# Patient Record
Sex: MALE | Race: WHITE
[De-identification: names, ages, dates, MRNs, and addresses within clinical notes are randomized per-mention and may not be internally consistent; named-entity substitution may affect disease eponyms.]

---

## 2017-11-03 ENCOUNTER — HOSPITAL ENCOUNTER (OUTPATIENT)
Dept: HOSPITAL 92 - SDC | Age: 67
Discharge: HOME | End: 2017-11-03
Attending: ORTHOPAEDIC SURGERY
Payer: MEDICARE

## 2017-11-03 VITALS — BODY MASS INDEX: 42.7 KG/M2

## 2017-11-03 DIAGNOSIS — E66.9: ICD-10-CM

## 2017-11-03 DIAGNOSIS — K21.9: ICD-10-CM

## 2017-11-03 DIAGNOSIS — S82.191G: ICD-10-CM

## 2017-11-03 DIAGNOSIS — E11.319: ICD-10-CM

## 2017-11-03 DIAGNOSIS — I25.10: ICD-10-CM

## 2017-11-03 DIAGNOSIS — Z88.2: ICD-10-CM

## 2017-11-03 DIAGNOSIS — Z98.890: ICD-10-CM

## 2017-11-03 DIAGNOSIS — E11.21: ICD-10-CM

## 2017-11-03 DIAGNOSIS — E11.40: ICD-10-CM

## 2017-11-03 DIAGNOSIS — E03.9: ICD-10-CM

## 2017-11-03 DIAGNOSIS — E78.5: ICD-10-CM

## 2017-11-03 DIAGNOSIS — I50.9: ICD-10-CM

## 2017-11-03 DIAGNOSIS — I11.0: ICD-10-CM

## 2017-11-03 DIAGNOSIS — T81.4XXA: Primary | ICD-10-CM

## 2017-11-03 LAB
ANION GAP SERPL CALC-SCNC: 12 MMOL/L (ref 10–20)
APTT PPP: 30.2 SEC (ref 22.9–36.1)
BACTERIA UR QL AUTO: (no result) HPF
BASOPHILS # BLD AUTO: 0 THOU/UL (ref 0–0.2)
BASOPHILS NFR BLD AUTO: 0.1 % (ref 0–1)
BUN SERPL-MCNC: 44 MG/DL (ref 8.4–25.7)
CALCIUM SERPL-MCNC: 8.3 MG/DL (ref 7.8–10.44)
CHLORIDE SERPL-SCNC: 105 MMOL/L (ref 98–107)
CO2 SERPL-SCNC: 28 MMOL/L (ref 23–31)
CREAT CL PREDICTED SERPL C-G-VRATE: 51 ML/MIN (ref 70–130)
EOSINOPHIL # BLD AUTO: 0.1 THOU/UL (ref 0–0.7)
EOSINOPHIL NFR BLD AUTO: 1.2 % (ref 0–10)
HCT VFR BLD CALC: 22.7 % (ref 42–52)
HYALINE CASTS #/AREA URNS LPF: (no result) LPF
LYMPHOCYTES # BLD: 1 THOU/UL (ref 1.2–3.4)
LYMPHOCYTES NFR BLD AUTO: 10 % (ref 21–51)
MONOCYTES # BLD AUTO: 0.7 THOU/UL (ref 0.11–0.59)
MONOCYTES NFR BLD AUTO: 6.8 % (ref 0–10)
NEUTROPHILS # BLD AUTO: 8.6 THOU/UL (ref 1.4–6.5)
PROT UR STRIP.AUTO-MCNC: 100 MG/DL
PROTHROMBIN TIME: 15.7 SEC (ref 12–14.7)
RBC # BLD AUTO: 2.61 MILL/UL (ref 4.7–6.1)
RBC UR QL AUTO: (no result) HPF (ref 0–3)
WBC # BLD AUTO: 10.4 THOU/UL (ref 4.8–10.8)
WBC UR QL AUTO: (no result) HPF (ref 0–3)

## 2017-11-03 PROCEDURE — 93010 ELECTROCARDIOGRAM REPORT: CPT

## 2017-11-03 PROCEDURE — 85025 COMPLETE CBC W/AUTO DIFF WBC: CPT

## 2017-11-03 PROCEDURE — 85730 THROMBOPLASTIN TIME PARTIAL: CPT

## 2017-11-03 PROCEDURE — C9363 INTEGRA MESHED BIL WOUND MAT: HCPCS

## 2017-11-03 PROCEDURE — A4216 STERILE WATER/SALINE, 10 ML: HCPCS

## 2017-11-03 PROCEDURE — 15273 SKIN SUB GRFT T/ARM/LG CHILD: CPT

## 2017-11-03 PROCEDURE — 36416 COLLJ CAPILLARY BLOOD SPEC: CPT

## 2017-11-03 PROCEDURE — 81001 URINALYSIS AUTO W/SCOPE: CPT

## 2017-11-03 PROCEDURE — 80048 BASIC METABOLIC PNL TOTAL CA: CPT

## 2017-11-03 PROCEDURE — 82962 GLUCOSE BLOOD TEST: CPT

## 2017-11-03 PROCEDURE — 93005 ELECTROCARDIOGRAM TRACING: CPT

## 2017-11-03 PROCEDURE — 85610 PROTHROMBIN TIME: CPT

## 2017-11-03 PROCEDURE — 71010: CPT

## 2017-11-03 NOTE — RAD
CHEST ONE VIEW:

 

HISTORY:

Preop.

 

COMPARISON:

07/20/2017

 

FINDINGS:

The cardiac silhouette is magnified and at the upper limits of normal in size.  Pulmonary vasculatur
e is at the upper limits of normal and accentuated by shallow inspiration.  Mediastinum is midline w
ith postoperative changes evident.  Opacity at each costophrenic angle, greater on the right, has th
e appearance of pleural fluid.

 

IMPRESSION:

Borderline pulmonary vascular congestion with small bilateral pleural effusions.

 

POS: [facility]

## 2017-11-03 NOTE — OP
DATE OF SURGERY:  11/03/2017

 

PREOPERATIVE DIAGNOSIS:  Right patellar tendon, 2 cm open wound with exposed tendon.

 

FINDINGS:  

1.  Right patellar tendon, 2 cm open wound with exposed tendon.  No gross infection.

2.  Multiple fungal infection areas, perineum and distal two-thirds of the leg.

 

COMPLICATIONS:  None.

 

TOURNIQUET TIME:  None.

 

ESTIMATED BLOOD LOSS:  Less than or equal to 5 mL.

 

PROCEDURE:

1.  Debridement of wound.

2.  Patellar tendon tenotomy.

3.  Application of Integra graft, 2 inch x 2 inch to cover what really was a 1.5 inch diameter wound
 rectangular shaped.

4.  Application of nystatin ointment to all the fungal areas of the right leg and the perineum at th
e end of the procedure.

 

COMPLICATIONS:  At the end of the procedure after we had placed the dressings on the fungal portions
 of the leg and the patella operation, the patient began to have copious bowel movements requiring c
hanges of dressing and perineal care.  This occurred in the operating room.  

 

ANESTHESIA:  Anesthesia by [employer] [facility]. 

 

PROCEDURE IN DETAIL:  The limb prepped and draped.  Timeout was done appropriately.  The patient was
 large and had to be placed on the bed using a HoverMatt.  HoverMatt had been deflated.  

 

We did tie lower extremity completely prepped and draped the ankle and foot covered with a sterile s
tockinette, we then began a debridement.  We used a tenotomy scissor, 11 blade knife, Adson's, and a
 curet.  We debrided the wound edges down to include the fascia and patellar tendon.  I then perform
ed patellar tenotomy approximately 1-1.5 mm stick accomplished to get to fresh tendon as the remaini
ng tendon seen was already exposed to the air.  We then dissected around the tissue between the tend
on and the subcutaneous tissue and the tendon and deep and we did not find a joint infection, both v
isible or expressible.

 

We irrigated the area with 500 mL normal saline.  We obtained hemostasis.  We then placed Integra gr
aft on the wound, stapled it down appropriately, including stapled in the center.  It was then bolst
ered with bacitracin under Adaptic under soaked salt mineral oil cotton balls.  All was stapled.  Th
ere was no gross motion at the bolstered area.  This would be with the knee flexed 90 degrees, but t
he patient does not ambulate.

 

We then placed bacitracin on the patient's leg from the edge of the patella tendon dressing to the m
edial malleolus and cover that with a Kerlix, and then placed Ace wrap on both.  When we tried to pr
epare the patient to leave the bed, he began to have a bowel movement described above which required
 cleaning, and eventually had to place him in a diaper.  He left; however, with this cleaned with ev
idence of an external hemorrhoid.  Bacitracin was also applied in the scrotal area where he had some
 fungus as well.

 

The patient went to recovery room without evidence of anesthetic or operative complication.

## 2017-11-03 NOTE — EKG
Test Reason : PREOP

Blood Pressure : ***/*** mmHG

Vent. Rate : 106 BPM     Atrial Rate : 106 BPM

   P-R Int : 000 ms          QRS Dur : 086 ms

    QT Int : 350 ms       P-R-T Axes : 065 017 160 degrees

   QTc Int : 464 ms

 

Sinus tachycardia

Low voltage QRS

Possible Inferior infarct (cited on or before 30-MAR-1997)

Abnormal ECG

When compared with ECG of 17-JUN-2017 09:43,

Vent. rate has increased BY  44 BPM

T wave inversion more evident in Lateral leads

Confirmed by DR. SARAH LLAMAS ([id number]) on 11/3/2017 4:13:02 PM

 

Referred By:  SEBAS           Confirmed By:DR. SARAH LLAMAS

## 2018-01-02 ENCOUNTER — HOSPITAL ENCOUNTER (INPATIENT)
Dept: HOSPITAL 92 - ERS | Age: 68
LOS: 15 days | Discharge: NURSING, REHAB OR LONG-TERM CARE | DRG: 580 | End: 2018-01-17
Attending: INTERNAL MEDICINE | Admitting: INTERNAL MEDICINE
Payer: MEDICARE

## 2018-01-02 VITALS — BODY MASS INDEX: 37.8 KG/M2

## 2018-01-02 DIAGNOSIS — Z95.1: ICD-10-CM

## 2018-01-02 DIAGNOSIS — L03.115: Primary | ICD-10-CM

## 2018-01-02 DIAGNOSIS — N17.9: ICD-10-CM

## 2018-01-02 DIAGNOSIS — E66.9: ICD-10-CM

## 2018-01-02 DIAGNOSIS — S82.101A: ICD-10-CM

## 2018-01-02 DIAGNOSIS — E10.69: ICD-10-CM

## 2018-01-02 DIAGNOSIS — M86.161: ICD-10-CM

## 2018-01-02 DIAGNOSIS — E87.5: ICD-10-CM

## 2018-01-02 DIAGNOSIS — E10.40: ICD-10-CM

## 2018-01-02 DIAGNOSIS — T84.622A: ICD-10-CM

## 2018-01-02 DIAGNOSIS — I25.10: ICD-10-CM

## 2018-01-02 DIAGNOSIS — M00.9: ICD-10-CM

## 2018-01-02 DIAGNOSIS — E10.65: ICD-10-CM

## 2018-01-02 DIAGNOSIS — I87.2: ICD-10-CM

## 2018-01-02 DIAGNOSIS — D63.1: ICD-10-CM

## 2018-01-02 DIAGNOSIS — E87.1: ICD-10-CM

## 2018-01-02 DIAGNOSIS — Z89.421: ICD-10-CM

## 2018-01-02 DIAGNOSIS — N18.4: ICD-10-CM

## 2018-01-02 DIAGNOSIS — B95.62: ICD-10-CM

## 2018-01-02 DIAGNOSIS — E10.649: ICD-10-CM

## 2018-01-02 DIAGNOSIS — I12.9: ICD-10-CM

## 2018-01-02 DIAGNOSIS — I87.8: ICD-10-CM

## 2018-01-02 DIAGNOSIS — E10.22: ICD-10-CM

## 2018-01-02 LAB
ALBUMIN SERPL BCG-MCNC: 2.6 G/DL (ref 3.4–4.8)
ALP SERPL-CCNC: 120 U/L (ref 40–150)
ALT SERPL W P-5'-P-CCNC: 16 U/L (ref 8–55)
ANION GAP SERPL CALC-SCNC: 15 MMOL/L (ref 10–20)
AST SERPL-CCNC: 21 U/L (ref 5–34)
BACTERIA UR QL AUTO: (no result) HPF
BASOPHILS # BLD AUTO: 0 THOU/UL (ref 0–0.2)
BASOPHILS NFR BLD AUTO: 0 % (ref 0–1)
BILIRUB SERPL-MCNC: 0.3 MG/DL (ref 0.2–1.2)
BUN SERPL-MCNC: 72 MG/DL (ref 8.4–25.7)
CALCIUM SERPL-MCNC: 8.9 MG/DL (ref 7.8–10.44)
CHLORIDE SERPL-SCNC: 102 MMOL/L (ref 98–107)
CO2 SERPL-SCNC: 25 MMOL/L (ref 23–31)
CREAT CL PREDICTED SERPL C-G-VRATE: 0 ML/MIN (ref 70–130)
CRYSTAL-AUWI FLAG: 0 (ref 0–15)
EOSINOPHIL # BLD AUTO: 0.2 THOU/UL (ref 0–0.7)
EOSINOPHIL NFR BLD AUTO: 2.1 % (ref 0–10)
GLOBULIN SER CALC-MCNC: 4.6 G/DL (ref 2.4–3.5)
GLUCOSE SERPL-MCNC: 144 MG/DL (ref 80–115)
HEV IGM SER QL: 0 (ref 0–7.99)
HGB BLD-MCNC: 7.3 G/DL (ref 14–18)
HYALINE CASTS #/AREA URNS LPF: (no result) LPF
LYMPHOCYTES # BLD: 1.3 THOU/UL (ref 1.2–3.4)
LYMPHOCYTES NFR BLD AUTO: 12.4 % (ref 21–51)
MCH RBC QN AUTO: 28 PG (ref 27–31)
MCV RBC AUTO: 88.6 FL (ref 80–94)
MONOCYTES # BLD AUTO: 0.8 THOU/UL (ref 0.11–0.59)
MONOCYTES NFR BLD AUTO: 8.1 % (ref 0–10)
NEUTROPHILS # BLD AUTO: 8 THOU/UL (ref 1.4–6.5)
NEUTROPHILS NFR BLD AUTO: 77.5 % (ref 42–75)
PATHC CAST-AUWI FLAG: 0.13 (ref 0–2.49)
PLATELET # BLD AUTO: 440 THOU/UL (ref 130–400)
POTASSIUM SERPL-SCNC: 4.5 MMOL/L (ref 3.5–5.1)
PROT UR STRIP.AUTO-MCNC: 300 MG/DL
RBC # BLD AUTO: 2.62 MILL/UL (ref 4.7–6.1)
RBC UR QL AUTO: (no result) HPF (ref 0–3)
SODIUM SERPL-SCNC: 137 MMOL/L (ref 136–145)
SP GR UR STRIP: 1.01 (ref 1–1.04)
SPERM-AUWI FLAG: 0 (ref 0–9.9)
WBC # BLD AUTO: 10.3 THOU/UL (ref 4.8–10.8)
YEAST-AUWI FLAG: 0 (ref 0–25)

## 2018-01-02 PROCEDURE — C1713 ANCHOR/SCREW BN/BN,TIS/BN: HCPCS

## 2018-01-02 PROCEDURE — 85610 PROTHROMBIN TIME: CPT

## 2018-01-02 PROCEDURE — 85652 RBC SED RATE AUTOMATED: CPT

## 2018-01-02 PROCEDURE — 36415 COLL VENOUS BLD VENIPUNCTURE: CPT

## 2018-01-02 PROCEDURE — 36416 COLLJ CAPILLARY BLOOD SPEC: CPT

## 2018-01-02 PROCEDURE — 81003 URINALYSIS AUTO W/O SCOPE: CPT

## 2018-01-02 PROCEDURE — 86140 C-REACTIVE PROTEIN: CPT

## 2018-01-02 PROCEDURE — 96375 TX/PRO/DX INJ NEW DRUG ADDON: CPT

## 2018-01-02 PROCEDURE — 36430 TRANSFUSION BLD/BLD COMPNT: CPT

## 2018-01-02 PROCEDURE — 86901 BLOOD TYPING SEROLOGIC RH(D): CPT

## 2018-01-02 PROCEDURE — 83735 ASSAY OF MAGNESIUM: CPT

## 2018-01-02 PROCEDURE — 80053 COMPREHEN METABOLIC PANEL: CPT

## 2018-01-02 PROCEDURE — 80048 BASIC METABOLIC PNL TOTAL CA: CPT

## 2018-01-02 PROCEDURE — 96366 THER/PROPH/DIAG IV INF ADDON: CPT

## 2018-01-02 PROCEDURE — 85025 COMPLETE CBC W/AUTO DIFF WBC: CPT

## 2018-01-02 PROCEDURE — 85027 COMPLETE CBC AUTOMATED: CPT

## 2018-01-02 PROCEDURE — 85730 THROMBOPLASTIN TIME PARTIAL: CPT

## 2018-01-02 PROCEDURE — 78315 BONE IMAGING 3 PHASE: CPT

## 2018-01-02 PROCEDURE — 87186 SC STD MICRODIL/AGAR DIL: CPT

## 2018-01-02 PROCEDURE — 96365 THER/PROPH/DIAG IV INF INIT: CPT

## 2018-01-02 PROCEDURE — 83605 ASSAY OF LACTIC ACID: CPT

## 2018-01-02 PROCEDURE — C1751 CATH, INF, PER/CENT/MIDLINE: HCPCS

## 2018-01-02 PROCEDURE — 36569 INSJ PICC 5 YR+ W/O IMAGING: CPT

## 2018-01-02 PROCEDURE — 81015 MICROSCOPIC EXAM OF URINE: CPT

## 2018-01-02 PROCEDURE — 86900 BLOOD TYPING SEROLOGIC ABO: CPT

## 2018-01-02 PROCEDURE — 86850 RBC ANTIBODY SCREEN: CPT

## 2018-01-02 PROCEDURE — 87077 CULTURE AEROBIC IDENTIFY: CPT

## 2018-01-02 PROCEDURE — 76001: CPT

## 2018-01-02 PROCEDURE — 80202 ASSAY OF VANCOMYCIN: CPT

## 2018-01-02 PROCEDURE — P9016 RBC LEUKOCYTES REDUCED: HCPCS

## 2018-01-02 PROCEDURE — A4216 STERILE WATER/SALINE, 10 ML: HCPCS

## 2018-01-02 PROCEDURE — 87205 SMEAR GRAM STAIN: CPT

## 2018-01-02 PROCEDURE — 76770 US EXAM ABDO BACK WALL COMP: CPT

## 2018-01-02 PROCEDURE — 87070 CULTURE OTHR SPECIMN AEROBIC: CPT

## 2018-01-02 PROCEDURE — A9503 TC99M MEDRONATE: HCPCS

## 2018-01-02 PROCEDURE — C1769 GUIDE WIRE: HCPCS

## 2018-01-02 NOTE — RAD
EXAM:

RIGHT KNEE THREE VIEWS

1/2/18

 

HISTORY: 

Cellulitis.

 

COMPARISON:  

None.

 

FINDINGS:  

There is a fracture and irregularity involving the proximal tibial extensive sclerosis is identified.
 The possibility of an incompletely healed fracture is raised. An underlying infectious process canno
t be excluded. 

 

Extensive enthesopathic change and bony hypertrophy is noted. 

 

Incompletely evaluated internal fixation hardware is present. 

 

There is evidence of a suprapatellar effusion.

 

IMPRESSION:  

Incompletely healed fracture. Acute fracture or osteomyelitis cannot be excluded. Comparison with natalie
or radiographs would be beneficial. The only prior radiography available is a two views right tibia/f
ibula on 6/17/17 at the time of fracture. 

 

POS: [facility]

## 2018-01-03 LAB
ANION GAP SERPL CALC-SCNC: 16 MMOL/L (ref 10–20)
BASOPHILS # BLD AUTO: 0 THOU/UL (ref 0–0.2)
BASOPHILS NFR BLD AUTO: 0.4 % (ref 0–1)
BUN SERPL-MCNC: 69 MG/DL (ref 8.4–25.7)
CALCIUM SERPL-MCNC: 8.8 MG/DL (ref 7.8–10.44)
CHLORIDE SERPL-SCNC: 104 MMOL/L (ref 98–107)
CO2 SERPL-SCNC: 22 MMOL/L (ref 23–31)
CREAT CL PREDICTED SERPL C-G-VRATE: 38 ML/MIN (ref 70–130)
EOSINOPHIL # BLD AUTO: 0.2 THOU/UL (ref 0–0.7)
EOSINOPHIL NFR BLD AUTO: 2 % (ref 0–10)
GLUCOSE SERPL-MCNC: 182 MG/DL (ref 80–115)
HGB BLD-MCNC: 7.7 G/DL (ref 14–18)
LYMPHOCYTES # BLD: 1 THOU/UL (ref 1.2–3.4)
LYMPHOCYTES NFR BLD AUTO: 10.1 % (ref 21–51)
MCH RBC QN AUTO: 28.5 PG (ref 27–31)
MCV RBC AUTO: 89 FL (ref 80–94)
MONOCYTES # BLD AUTO: 0.7 THOU/UL (ref 0.11–0.59)
MONOCYTES NFR BLD AUTO: 7.5 % (ref 0–10)
NEUTROPHILS # BLD AUTO: 7.7 THOU/UL (ref 1.4–6.5)
NEUTROPHILS NFR BLD AUTO: 80 % (ref 42–75)
PLATELET # BLD AUTO: 432 THOU/UL (ref 130–400)
POTASSIUM SERPL-SCNC: 4.7 MMOL/L (ref 3.5–5.1)
RBC # BLD AUTO: 2.7 MILL/UL (ref 4.7–6.1)
SODIUM SERPL-SCNC: 137 MMOL/L (ref 136–145)
WBC # BLD AUTO: 9.6 THOU/UL (ref 4.8–10.8)

## 2018-01-03 PROCEDURE — 30233N1 TRANSFUSION OF NONAUTOLOGOUS RED BLOOD CELLS INTO PERIPHERAL VEIN, PERCUTANEOUS APPROACH: ICD-10-PCS | Performed by: INTERNAL MEDICINE

## 2018-01-03 RX ADMIN — HEPARIN SODIUM SCH UNITS: 5000 INJECTION, SOLUTION INTRAVENOUS; SUBCUTANEOUS at 15:34

## 2018-01-03 RX ADMIN — HYDROCODONE BITARTRATE AND ACETAMINOPHEN PRN TAB: 5; 325 TABLET ORAL at 09:53

## 2018-01-03 RX ADMIN — HEPARIN SODIUM SCH UNITS: 5000 INJECTION, SOLUTION INTRAVENOUS; SUBCUTANEOUS at 20:56

## 2018-01-03 RX ADMIN — CEFEPIME HYDROCHLORIDE SCH MLS: 1 INJECTION, POWDER, FOR SOLUTION INTRAMUSCULAR; INTRAVENOUS at 06:06

## 2018-01-03 RX ADMIN — ASPIRIN SCH MG: 81 TABLET ORAL at 09:56

## 2018-01-03 RX ADMIN — HEPARIN SODIUM SCH UNITS: 5000 INJECTION, SOLUTION INTRAVENOUS; SUBCUTANEOUS at 09:58

## 2018-01-03 RX ADMIN — VANCOMYCIN HYDROCHLORIDE SCH MLS: 1 INJECTION, POWDER, LYOPHILIZED, FOR SOLUTION INTRAVENOUS at 20:51

## 2018-01-03 RX ADMIN — HYDROCODONE BITARTRATE AND ACETAMINOPHEN PRN TAB: 5; 325 TABLET ORAL at 15:33

## 2018-01-03 RX ADMIN — HYDROCODONE BITARTRATE AND ACETAMINOPHEN PRN TAB: 5; 325 TABLET ORAL at 06:10

## 2018-01-03 RX ADMIN — Medication SCH ML: at 06:06

## 2018-01-03 RX ADMIN — Medication SCH ML: at 20:52

## 2018-01-03 RX ADMIN — HYDROCODONE BITARTRATE AND ACETAMINOPHEN PRN TAB: 5; 325 TABLET ORAL at 20:53

## 2018-01-03 RX ADMIN — INSULIN LISPRO PRN UNIT: 100 INJECTION, SOLUTION INTRAVENOUS; SUBCUTANEOUS at 12:57

## 2018-01-03 NOTE — CON
DATE OF CONSULTATION:  01/03/2018

 

REASON FOR CONSULTATION:  Inflammatory process of right lower extremity.

 

HISTORY OF PRESENT ILLNESS:  A 67-year-old whom I had seen at end of June last year when he presented
 with a history of type 2 diabetes, hypertension, stage III renal insufficiency who sustained a fall 
and had a tibia fibula fracture in June of last year.  The patient had intramedullary nailing with lo
cking screws and then he was given Omnicef for some indication probably cellulitis, then developed in
flammatory changes in the right leg with pain, admitted with hypotension.  Patient was given IV vanco
mycin.  At that time, Pseudomonas mendocina was isolated from the site and he was continued on vancom
ycin and cefepime.  Because of concern with deeper infection, he was continued on cefepime and vancom
ycin till the end of August.  In November of last year, he had a procedure to manage nonhealing wound
 in the right patellar tendon area with an area of exposed tendon and he underwent debridement of wou
nd in patellar tenotomy application of a skin graft.  At that point, we do not have any microbiology 
information.  At this time, patient was brought in because of worsening inflammatory changes in the r
ight lower extremity around the knee and some drainage in the anterior segment associated with rednes
s.

 

Initial laboratory data includes; white cell count of 10.3, hemoglobin 7.3, MCV 88, platelets 440 wit
h 77% neutrophils and C-reactive protein was 18.81 and creatinine was a little bit higher than baseli
ne at 3.41.  There is x-ray of the knee, which showed incompletely healed fracture with irregularity 
involving the proximal tibial with extensive sclerosis identified.  Currently, Mr. Sanchez is awake. 
 He does not have a lot of sensation in the right lower extremity.  No headaches, no sore throat, marce
nophagia, dysphagia, no dyspnea, no cough or chest pain, no abdominal pain or diarrhea.  He is able t
o void without difficulty.

 

PAST MEDICAL HISTORY:  Includes obesity, type 2 diabetes, renal insufficiency stage 3, hypertension, 
neuropathy, fracture right lower extremity with open reduction and internal fixation which had to be 
removed, protracted treatment with cefepime and vancomycin and then a skin graft for persistent wound
 area in the right patellar region on the right side.

 

ALLERGIES:  SULFA DRUGS with rash.

 

CURRENT MEDICATIONS:  Includes Tylenol, Norco, Ecotrin, Bumex, PhosLo, cefepime, dextrose, Cymbalta, 
Pepcid, glucagon, Apresoline, insulin, levothyroxine, ondansetron, Crestor, tamsulosin, and vancomyci
n.

 

FAMILY HISTORY:  Noncontributory.

 

PHYSICAL EXAMINATION:

VITAL SIGNS:  Essentially normal.

SKIN:  Examination shows the areas of venous insufficiency, stasis dermatitis, hyperkeratosis in the 
legs.  An area of erythema in the circumferential distribution with the epicenter right in the anteri
or knee area, there is a depression at the center of the prepatellar region.  A little bit of dried b
lood.  No purulence.  The erythema extends from that area towards the entire anterior prepatellar reg
ion extending almost circumferentially around the knee and lower to the upper segment of the right le
g.  The patient has peripheral IV access and no Roa catheter.  No lymphadenopathy.

HEENT:  Ocular movements are conjugate.  Patient has only a few remaining teeth in marked decay.

NECK:  Supple, no jugular vein distention.

LUNGS:  With symmetric clear breath sounds.

HEART:  S1, S2.  No S3 or S4.

ABDOMEN:  Soft, not distended or tender.  No ascites.  No bladder distention.

EXTREMITIES:  Pulses are faintly palpable in dorsalis pedis.  Capillary refill was normal.  He has qu
ite a bit of mobility impairment in lower extremities with stiffness, weakness.  Plantar responses ar
e indifferent in the upper extremities.  He is able to move a little bit better, but he does have atr
ophy of the interosseous muscles in the right hand, which is a chronic finding.

NEUROLOGIC:  His cognitive function is about the same.  He is awake, follows commands.  Fairly decent
 recollection.

 

LABORATORY DATA:  The latest creatinine down to 3.24, glucose 185, CO2 22.  Liver profile normal.  Al
bumin 2.6 and again reviewing the cultures from July last year with MRSA from tibia tissue, Staph epi
dermides, Staph aureus, MRSA, vancomycin OLAYINKA was 1.

 

ASSESSMENT:  Diabetes type 2, renal insufficiency stage 4, hypertension, coronary artery disease, fra
ctured right tibia and knee now with inflammatory process, removal of hardware and protracted treatme
nt with antimicrobial therapy, now with recrudescence of inflammatory process following November proc
edure.

 

DISCUSSION:  The concern is with osteomyelitis of the tibia including the possibility of chronic oste
omyelitis versus a more superficial process.  We will proceed with an MRI without contrast and contin
ue antimicrobial therapy.  We may need to request Orthopedic Surgery assistance to biopsy the area fo
r cultures.  It is likely that the same organisms involved in July process would be involved.  We cou
ld therefore potentially treat empirically as well.  I believe blood cultures have been submitted.  HOSSEIN nolasco will follow those.

## 2018-01-03 NOTE — HP
PRIMARY CARE PHYSICIAN:  Iván Leung MD

 

CHIEF COMPLAINT:  "My legs have been becoming more sore for the last 3 days."

 

HISTORY OF PRESENT ILLNESS:  Mr. Dexter is a pleasant 67-year-old gentleman who has a history of di
abetes mellitus as well as hypertension.  The patient recently had surgery on his right knee in which
 he had some rods and pins placed as well as had some infected hardware removed.  He was treated in [facility] facility and placed on vancomycin and cefepime in which he took until the end of August.  He says 
that he was doing fine until about 3-4 days ago when he noticed that his right leg was becoming more 
and more sore.  He lives at [facility] and the nurse there noted some drainage from the knee as we
ll.  He says he is unable to tell whether or not there was more redness because he is unable to look 
down to see his legs, but because of the drainage and the soreness, he was sent to the emergency room
 at the [facility].  He was evaluated and then transferred to our facility for further 
evaluation due to concern for cellulitis and possible infected joints.  The patient denies any fevers
 or chills.  No other significant complaints.  He is basically nonambulatory, but says he was able to
 do some transferring in and out of bed on his own, but he says this has become more difficult due to
 the pain.

 

REVIEW OF SYSTEMS:  Constitutional:  He denies any fevers or chills, no night sweats or weight loss. 
 HEENT:  No headaches, no dizziness, no visual changes, no sore throat, no rhinorrhea, neck pain, no 
adenopathy.  Pulmonary:  No hemoptysis, no cough, no wheezing.  Cardiovascular:  He denies any chest 
pain, no shortness of breath, no PND, no orthopnea.  Gastrointestinal:  No abdominal pain, no nausea,
 no vomiting, no change in bowels.  Genitourinary:  No urinary frequency, hematuria, no hesitancy.  N
eurologic:  No focal weakness, numbness, no seizures.  Psychiatric:  No symptoms of anxiety or depres
yolette.  Skin and Integument:  As the history of present illness.

 

PAST MEDICAL HISTORY:  Significant for diabetes mellitus type 1.  He says he has been diabetic for 50
 years, chronic kidney disease stage 4, hypertension, and coronary artery disease.

 

PAST SURGICAL HISTORY:  He has had coronary artery bypass surgery, left ankle surgery, right elbow serrano
rgery, fifth toe on the right foot amputated, as well as the knee surgery, and hardware removal.

 

SOCIAL HISTORY:  He resides at [facility].  He is a nonsmoker, nondrinker.  He has 2 children.  He
 is .  His surrogate decision maker is Mr. Frandy Sanchez and he would like to be a [employer]
.

 

FAMILY HISTORY:  Significant for diabetes in his mother as well as coronary artery disease in his fat
her.

 

ALLERGIES:  SULFA.

 

MEDICATIONS:  Taken from the records from [facility] and include aspirin 81 mg daily, Lipitor 40 m
g daily, bumetanide 0.5 mg twice a day, calcium acetate 667 mg 2 tablets t.i.d., docusate 100 mg p.r.
n., Cymbalta 60 mg daily, fentanyl patch 25 mcg every 3 days, Gas-X p.r.n., hydralazine 25 mg q.i.d.,
 Humalog insulin 5 units t.i.d. with meals, Norco 5/325 q.4 p.r.n., omeprazole 20 mg daily, Lantus in
sulin 35 units twice a day, Zofran 4 mg p.r.n., MiraLax 17 grams daily, Synthroid 25 mcg daily, Floma
x 0.4 mg daily, Xanax 0.5 mg as needed, vitamin C daily, and Crestor 20 mg daily.

 

PHYSICAL EXAMINATION:

GENERAL:  He is alert and oriented.  He appears to be in no acute distress.

VITAL SIGNS:  Blood pressure was 111/48, heart rate 91, respiratory rate of 16, temperature is 98.2.

HEENT:  Pupils are equal, round, and reactive.  Extraocular muscles are intact.  Sclerae are anicteri
c.  Throat:  There is no erythema, no exudates.

NECK:  No adenopathy, no bruits.

LUNGS:  Clear.  There is no wheezing, no rales.

CARDIOVASCULAR:  He has normal S1, S2.  I did not appreciate an S3 or S4.  No murmurs, clicks, or rub
s.

ABDOMEN:  Obese, it is soft, it is nontender, nondistended.  Positive for bowel sounds.  No rebound o
r guarding.

EXTREMITIES:  He has got chronic venous stasis changes bilaterally.  On his right knee, he has some s
erous drainage.  There are approximately four staples that are still in place.  The skin is thickened
.  He has got some scaling there on most of the calf as well as some erythema and mild warmth.  His f
ifth toes amputated on the right foot, he has got diminished, but palpable dorsalis pedis pulses.  Al
so, on the left leg, there is some excoriation and some erythema as well.

NEUROLOGICALLY:  The exam is grossly nonfocal.

 

SIGNIFICANT LABORATORY:  White blood cell count 10.3, hemoglobin 7.3, hematocrit is 23.2, platelet co
unt is 440.  Sodium 137, potassium 4.5, chloride is 102, CO2 is 25, BUN of 72, creatinine 3.41, gluco
se is 144.  C-reactive protein is elevated at 18.81.

 

ASSESSMENT AND PLAN:

1.  This is a pleasant 67-year-old gentleman who has developed some pain as well as redness and drain
age from the right knee as well as an area that appears to be cellulitis in the right lower extremity
.  It is possible that he has recrudescence of the infection that he had treated back in August.  The
 plan will be to place him in the hospital and we will start him on IV antibiotics.  We will place tana brooks on the antibiotics that he had been treated on his prior hospitalization and this included vancomyc
in and cefepime.  We will consult Infectious Disease for aid in antibiotic choice and length of thera
py and we will also consult Orthopedic Surgery in the event that the joint is infected.

2.  Diabetes mellitus.  We will go ahead and continue his usual medications as well as sliding scale 
insulin.

3.  Chronic kidney disease, stage 4.  He appears to have some degree of acute renal failure on top of
 chronic kidney disease.  This could be related to the infection.  We will continue to monitor his cr
eatinine and consult Nephrology if needed.

4.  Coronary artery disease.  This appears to be clinically stable.  We will continue his medications
 regarding coronary artery disease.

5.  The patient will be placed on deep venous thrombosis and gastrointestinal prophylaxis.

## 2018-01-03 NOTE — PDOC.PN
- Subjective


Encounter Start Date: 01/03/18


Encounter Start Time: 09:30


-: old records requested/rev





Patient seen and examined. No new complaints. No overnight events





- Objective


Resuscitation Status: 


 











Resuscitation Status           FULL:Full Resuscitation














MAR Reviewed: Yes


Vital Signs & Weight: 


 Vital Signs (12 hours)











  Temp Pulse Resp BP Pulse Ox


 


 01/03/18 09:57   89   


 


 01/03/18 08:33  98.4 F  89  19  98/49 L  97


 


 01/03/18 01:14  98.2 F  96  20  117/55 L  100








 Weight











Weight                         264 lb 8.875 oz














I&O: 


 











 01/02/18 01/03/18 01/04/18





 06:59 06:59 06:59


 


Intake Total  450 


 


Output Total  620 


 


Balance  -170 











Result Diagrams: 


 01/03/18 06:03





 01/03/18 06:03


Additional Labs: 


 Accuchecks











  01/03/18





  06:20


 


POC Glucose  185 H














Phys Exam





- Physical Examination


Constitutional: NAD


HEENT: PERRLA, moist MMs, sclera anicteric


Neck: no JVD, supple


Respiratory: no wheezing, no rales, no rhonchi


Cardiovascular: RRR, no significant murmur, no rub


Gastrointestinal: soft, non-tender, no distention, positive bowel sounds


Musculoskeletal: pulses present


right leg cellulitis


Neurological: non-focal, normal sensation


Psychiatric: normal affect


Skin: no rash, normal turgor





Dx/Plan


(1) Cellulitis of right lower extremity


Code(s): L03.115 - CELLULITIS OF RIGHT LOWER LIMB   Status: Acute   





(2) Anemia of renal disease


Code(s): D63.1 - ANEMIA IN CHRONIC KIDNEY DISEASE   Status: Chronic   





(3) CAD (coronary artery disease)


Code(s): I25.10 - ATHSCL HEART DISEASE OF NATIVE CORONARY ARTERY W/O ANG PCTRS 

  Status: Chronic   


Qualifiers: 


 





(4) DM2 (diabetes mellitus, type 2)


Status: Chronic   


Qualifiers: 


 





(5) HTN (hypertension)


Code(s): I10 - ESSENTIAL (PRIMARY) HYPERTENSION   Status: Chronic   





(6) Neuropathy


Code(s): G62.9 - POLYNEUROPATHY, UNSPECIFIED   Status: Chronic   





(7) Obesity (BMI 30-39.9)


Code(s): E66.9 - OBESITY, UNSPECIFIED   Status: Chronic   





- Plan


cont current plan of care, continue antibiotics





* skin care


* continue iv antibiotics as per below


* ortho help appreciated


* staples removed


* medication reviewed as below


* symptomatic treatment








Review of Systems





- Review of Systems


Constitutional: negative: fever, chills, sweats, weakness, malaise, other


ENT: negative: Ear Pain, Ear Discharge, Nose Pain, Nose Discharge, Nose 

Congestion, Mouth Pain, Mouth Swelling, Throat Pain, Throat Swelling, Other


Respiratory: negative: Cough, Dry, Shortness of Breath, Hemoptysis, SOB with 

Excertion, Pleuritic Pain, Sputum, Wheezing


Cardiovascular: negative: chest pain, palpitations, orthopnea, paroxysmal 

nocturnal dyspnea, edema, light headedness, other


Gastrointestinal: negative: Nausea, Vomiting, Abdominal Pain, Diarrhea, 

Constipation, Melena, Hematochezia, Other


Genitourinary: negative: Dysuria, Frequency, Incontinence, Hematuria, Retention

, Other


Musculoskeletal: Leg Pain.  negative: Neck Pain, Shoulder Pain, Arm Pain, Back 

Pain, Hand Pain, Foot Pain, Other





- Medications/Allergies


Allergies/Adverse Reactions: 


 Allergies











Allergy/AdvReac Type Severity Reaction Status Date / Time


 


Sulfa (Sulfonamide Allergy   Verified 11/02/17 15:34





Antibiotics)     











Medications: 


 Current Medications





Acetaminophen (Tylenol)  650 mg PO Q4H PRN


   PRN Reason: Headache/Fever or Pain


Hydrocodone Bitart/Acetaminophen (Norco 5/325)  1 tab PO Q4H PRN


   PRN Reason: Moderate Pain (4-6)


   Last Admin: 01/03/18 09:53 Dose:  1 tab


Aspirin (Ecotrin)  81 mg PO DAILY [facility]


   Last Admin: 01/03/18 09:56 Dose:  81 mg


Bumetanide (Bumex)  0.5 mg PO DAILY [facility]


   Last Admin: 01/03/18 09:56 Dose:  0.5 mg


Calcium Acetate (Phoslo)  1,334 mg PO TID-[facility]


   Last Admin: 01/03/18 09:55 Dose:  1,334 mg


Dextrose/Water (Dextrose 50%)  25 gm SLOW IVP PRN PRN


   PRN Reason: Hypoglycemia


Docusate Sodium (Colace)  100 mg PO BID [facility]


   Last Admin: 01/03/18 09:56 Dose:  100 mg


Duloxetine HCl (Cymbalta)  60 mg PO DAILY [facility]


   Last Admin: 01/03/18 09:57 Dose:  60 mg


Famotidine (Pepcid)  20 mg PO DAILY [facility]


   Last Admin: 01/03/18 09:57 Dose:  20 mg


Glucagon (Glucagon)  1 mg IM PRN PRN


   PRN Reason: Hypoglycemia


Heparin Sodium (Porcine) (Heparin)  5,000 units SC TID [facility]


   Last Admin: 01/03/18 09:58 Dose:  5,000 units


Hydralazine HCl (Apresoline)  10 mg SLOW IVP Q4H PRN


   PRN Reason: Systolic BP > 180


Hydralazine HCl (Apresoline)  25 mg PO QID [facility]


   Last Admin: 01/03/18 09:57 Dose:  25 mg


Dextrose/Water (D5w)  1,000 mls @ 0 mls/hr IV .Q0M PRN; As Directed


   PRN Reason: Hypoglycemia


Insulin Detemir 35 units/ (Miscellaneous Medication)  0.35 mls @ 0 mls/hr SC HS 

[facility]


Insulin Detemir 35 units/ (Miscellaneous Medication)  0.35 mls @ 0 mls/hr SC 

QAM [facility]


   Last Admin: 01/03/18 10:04 Dose:  0.35 mls


Vancomycin HCl 1.25 gm/ Sodium (Chloride)  250 mls @ 166.667 mls/hr IVPB 2000 

[facility]


Cefepime HCl 1 gm/Miscellaneous Medication 1 each/ Sterile Water  10 mls @ 120 

mls/hr SLOW IVP 0600 [facility]


   Last Admin: 01/03/18 06:06 Dose:  10 mls


Insulin Human Lispro (Humalog)  0 units SC .BEDTIME SLIDING SC PRN


   PRN Reason: Bedtime Correctional Scale


Insulin Human Lispro (Humalog)  0 units SC .MODERATE SLIDING SC PRN


   PRN Reason: Moderate Correctional Scale


Levothyroxine Sodium (Synthroid)  25 mcg PO 0600 [facility]


   Last Admin: 01/03/18 06:04 Dose:  Not Given


Magnesium Hydroxide (Milk Of Magnesium)  30 ml PO DAILYPRN PRN


   PRN Reason: Constipation


Miscellaneous Medication (Pharmacy To Dose)  1 each IVPB PRN PRN


   PRN Reason: Pharmacy to dose


Ondansetron HCl (Zofran Odt)  4 mg PO Q6H PRN


   PRN Reason: Nausea/Vomiting


Ondansetron HCl (Zofran)  4 mg IVP Q6H PRN


   PRN Reason: Nausea/Vomiting


Polyethylene Glycol (Miralax)  17 gm PO DAILY [facility]


   Last Admin: 01/03/18 10:05 Dose:  17 gm


Rosuvastatin Calcium (Crestor)  20 mg PO QAM [facility]


   Last Admin: 01/03/18 09:58 Dose:  20 mg


Sodium Chloride (Flush - Normal Saline)  10 ml IVF Q12HR [facility]


   Last Admin: 01/03/18 06:06 Dose:  10 ml


Sodium Chloride (Flush - Normal Saline)  10 ml IVF PRN PRN


   PRN Reason: Saline Flush


Tamsulosin HCl (Flomax)  0.4 mg PO HS [facility]

## 2018-01-03 NOTE — CON
DATE OF CONSULTATION:  01/03/2018

 

ORTHOPEDIC CONSULTATION 

 

REQUESTING PHYSICIAN:  Dr. Shaheen Oquendo.

 

CONSULTING PHYSICIAN:  Dr. Jordan Hagan.

 

REASON FOR CONSULTATION:  Left knee recurrent cellulitis.

 

BRIEF CLINICAL HISTORY:  Jarvis is a 67-year-old white male well known to our service for a closed red
uction with intramedullary nail fixation of a proximal tibia fracture in 06/2017.  The patient underw
ent hardware removal in July of last year as well for skin breakdown and a wound infection.  He was t
reated aggressively with intravenous antibiotics and subsequent infection did resolve.  Dr. Kim 
took the patient back to surgery in 11/03/2017 where he underwent a patellar tendon graft using the I
ntegra patch, then a primary closure of his knee wound which was the entry site for his original nail
 fixation.  Since then, apparently patient lost to follow up.  He lives at [facility] and I do not
 see any record of him returning to Dr. Kim's clinic for subsequent evaluation and followup.  Pl
ain radiographs obtained in the emergency room yesterday showed hardware to be intact and he has good
 bony callus formation at the meta-diaphyseal fracture site, but also noted on this view was #7 stain
less steel staples present, presumably these have replaced in November as I have read Dr. Kim's 
operative report.  He was admitted to Medicine Service, started on antibiotics.  Our service was cons
ulted for evaluation of the cellulitis and hardware status.  Also of note, on his prior admission in 
July he had polymicrobial infection of the wound to include Staph epidermidis MRSA as well.

 

Subjectively, the patient has been doing relatively well in the last couple of months.  He has been a
mbulating and transferring, but he does very short distances and requires maximal assist to do so.   
He has been essentially pain free until about 4 days ago when he started to have swelling and discomf
ort in the right knee.  He tells me that several people have enquired about staples in his right knee
, but I believe his vision is poor and he is unaware of these.

 

On exam, visual inspection of the right knee demonstrates him to have erythema extending down the kne
e, circumferential all the way down to the foot.  He has +1 to 2 pitting edema and this is chronic fo
r him.  He is neurovascularly intact, but his knee is stiff.  He cannot fully extend and this is new 
for him.

 

IMAGING STUDIES:  Three views of right knee demonstrates intramedullary nail with some lysis noted ar
ound both proximal screws.  He also has some joint line changes consistent with periarticular erosion
s best appreciated on AP view.  A large callus formation is noted at meta-diaphyseal fracture site.

 

LABORATORY DATA:  White blood cell count was 9.6 this morning, it was 10.3 yesterday.  Hemoglobin 7.7
, hematocrit 24.1, consistent with chronic anemia secondary to chronic renal disease.  BUN 69, and cr
eatinine is 3.24.  C-reactive protein is 18.81.

 

IMPRESSION:

1.  Right knee and leg cellulitis secondary to retained chronic staples.

2.  Chronic renal insufficiency.

3.  Diabetes type 2.

4.  Hyperglycemia.

5.  Retained hardware tibia in the form of intramedullary nail.  As to whether or not the joint itsel
f is involved is unknown, this may be just a severe cellulitis in superficial.

 

PLAN:

1.  Treat conservatively with antibiotics.

2.  Staples will be removed today.

3.  We will follow along with medicine, but as to whether or not to explore the joint and explant the
 hardware, we will decide later this evening.  No operative plans have been made at this point, but lloyd nolasco may post him for a later date if his cellulitis does not improve and his knee pain amplifies.

## 2018-01-03 NOTE — ULT
EXAM: RIGHT LOWER EXTREMITY VENOUS ULTRASOUND AND DOPPLER

 

HISTORY:

Cellulitis.  Pain.

 

COMPARISON:

06/29/2017

 

TECHNIQUE:

Gray scale, color flow, Doppler imaging, and spectral wave form analysis performed of the right lower
 extremity venous system.

 

FINDINGS:

There is compressibility, presence of flow and augmentation in the common femoral vein, femoral vein,
 and popliteal vein.  There is flow in the greater saphenous veins, profunda vein, and posterior tibi
al vein.

 

In the region of the common femoral vein and greater saphenous vein, there is a mildly enlarged lymph
 node with a preserved fatty hilum measuring 1.5 x 0.9 cm.

 

In the posterior medial right knee, there is a complex echotexture collection measuring 5.1 x 3.7 x 9
.4 cm.  The possibility of a complex fluid collection cannot be excluded.  Better interrogation with 
MRI may be beneficial.

 

IMPRESSION:  

1. No evidence of thrombus in the right lower extremity deep venous system.

2. Enlarged right lower extremity lymph nodes, presumably reactive.

3. Complex fluid in the right lower extremity is suspected.  Better interrogation with MRI is recomme
nded.

 

POS: [facility]

## 2018-01-04 LAB — VANCOMYCIN TROUGH SERPL-MCNC: 17.3 UG/ML

## 2018-01-04 RX ADMIN — MAGNESIUM HYDROXIDE PRN ML: 400 SUSPENSION ORAL at 19:59

## 2018-01-04 RX ADMIN — INSULIN LISPRO PRN UNIT: 100 INJECTION, SOLUTION INTRAVENOUS; SUBCUTANEOUS at 06:26

## 2018-01-04 RX ADMIN — CEFEPIME HYDROCHLORIDE SCH MLS: 1 INJECTION, POWDER, FOR SOLUTION INTRAMUSCULAR; INTRAVENOUS at 06:29

## 2018-01-04 RX ADMIN — HEPARIN SODIUM SCH UNITS: 5000 INJECTION, SOLUTION INTRAVENOUS; SUBCUTANEOUS at 16:05

## 2018-01-04 RX ADMIN — VANCOMYCIN HYDROCHLORIDE SCH MLS: 1 INJECTION, POWDER, LYOPHILIZED, FOR SOLUTION INTRAVENOUS at 19:59

## 2018-01-04 RX ADMIN — ASPIRIN SCH MG: 81 TABLET ORAL at 09:43

## 2018-01-04 RX ADMIN — HEPARIN SODIUM SCH UNITS: 5000 INJECTION, SOLUTION INTRAVENOUS; SUBCUTANEOUS at 19:59

## 2018-01-04 RX ADMIN — Medication SCH: at 13:01

## 2018-01-04 RX ADMIN — Medication SCH: at 20:47

## 2018-01-04 RX ADMIN — HYDROCODONE BITARTRATE AND ACETAMINOPHEN PRN TAB: 5; 325 TABLET ORAL at 06:28

## 2018-01-04 RX ADMIN — HYDROCODONE BITARTRATE AND ACETAMINOPHEN PRN TAB: 5; 325 TABLET ORAL at 11:34

## 2018-01-04 RX ADMIN — HEPARIN SODIUM SCH UNITS: 5000 INJECTION, SOLUTION INTRAVENOUS; SUBCUTANEOUS at 09:46

## 2018-01-04 RX ADMIN — HYDROCODONE BITARTRATE AND ACETAMINOPHEN PRN TAB: 5; 325 TABLET ORAL at 20:09

## 2018-01-04 RX ADMIN — HYDROCODONE BITARTRATE AND ACETAMINOPHEN PRN TAB: 5; 325 TABLET ORAL at 16:03

## 2018-01-04 NOTE — PQF
ASHLEY DOSS, LARRY PATRICK MD

[id number]                                                             SURG B-
[id number]

[id number]                             

                                   

CLINICAL DOCUMENTATION IMPROVEMENT CLARIFICATION FORM:  ICD-10 Updated



PLEASE DO AN ADDENDUM TO THE PROGRESS NOTE WITH ANY DOCUMENTATION UPDATES OR 
ADDITIONS AND CARRY THROUGH TO DC SUMMARY.   THANK YOU.



DATE:      1-4-18                                                       ATTN:  
DR. KUMARI



Please exercise your independent, professional judgment in responding to the 
clarification form. 

Clinical indicators are provided on the bottom of this form for your review



Please check appropriate box(s):



[  ] UTI    

[ X ] Contaminated urine specimen without UTI

[  ] Other diagnosis ___________

[  ] Unable to determine





For continuity of documentation, please document condition throughout progress 
notes and discharge summary.  Thank You.



CLINICAL INDICATORS - SIGNS / SYMPTOMS / LABS



ER:  UTI



1-2  UA - MODERATE LEUKOCYTES

               BACTERIA - 1+

               WBC           - > 50





RISK FACTORS

H&P  DM 

         CKD





TREATMENT:

MAR -  IV VANCOMYCIN 1-3

            IV MAXIPIME       1-3





THANK YOU,

SHELLY



(This form is maintained as a part of the permanent medical record)

 2015 [employer]  All Rights Reserved

Shelly Kitchen, RN, BS    [email or web address]    Cell Phone:  [phone]

                                                              

[facility]

## 2018-01-04 NOTE — NM
TRIPLE PHASE BONE SCAN OF THE KNEES

1/4/18

 

HISTORY: 

67-year-old male with cellulitis of the right lower extremity. 

 

RADIOPHARMACEUTICAL:

33 millicuries technetium 99m MDP injected intravenously. 

 

FINDINGS:  

Correlation is made with the right knee radiographs of 1/2/18.

 

There is increased blood flow and blood pooling to the right knee compared to the left. These images 
demonstrate increased uptake in the proximal right tibia. There is increased uptake is also seen in t
he patella and the distal right femur. 

 

IMPRESSION:  

Findings are suspicious for right proximal tibial osteomyelitis. The other possibility is an acute/he
aling fracture.

 

POS: [facility]

## 2018-01-04 NOTE — CON
DATE OF CONSULTATION:  01/04/2018

 

CHIEF COMPLAINT:  Right lower extremity swelling and cellulitis.

 

HISTORY OF PRESENT ILLNESS:  Mr. Dexter is a pleasant 67-year-old male who has been well known to o
 service.  He recently had a tibia nail performed in 06/2017, had recurrent admission to the hospit
al for drainage, had what appeared to be an infection and had hardware removed leaving the nail in pl
ace with IV antibiotics for infection resolution.  The patient was taken by Dr. Kim on 11/2017 f
or an Integra patch with primary closure of the original site.  Patient stated up until a week ago he
 was doing well, at which time he noted to have increasing pain.  The patient had admission to [facility] recently in the last couple of months.  The patient walks short distances.  He is able to t
ransVaddio and ambulate short distances.

 

PHYSICAL EXAMINATION: 

VITAL SIGNS:  Today 98, 88, 20, 99 ON 1.5 liters, 124/66 for blood pressure.

GENERAL:  Alert and oriented male in no acute distress.  

EXTREMITIES:  Right lower extremity; scab healed over the incision line anteriorly, dia edema noted
 with some erythema anterior in entire leg.  The patient has sensation diminished right lower extremi
ty consistent with neuropathy.  He will weakly flex and extend his toes.  The patient has pain to  li
ght touch.  He has got swollen, compressible compartments.

 

LABORATORY AND X-RAY FINDINGS:  White count 9.6, 7.7 and 24.1 H&H and platelets 432.  The patient has
 a CRP of 18.  The patient's cultures are still pending.  Blood cultures are still pending final resu
lts.

 

IMPRESSION:  Right lower extremity cellulitis, history of chronic infection, possible osteo with hist
ory of intramedullary nailing for right tibial nail, diabetic uncontrolled.

 

ASSESSMENT AND PLAN:  The patient has a better healing on x-ray at this point.  I feel that removal o
f hardware with definitive treatment with antibiotics after deep cultures are taken.  I would like th
e patient to undergo a period of IV antibiotics to help with swelling and edema in the leg to help wi
th our closure.  The patient will be elevated.  Will plan to do this early next week for hardware rem
oval.  At that time we will discuss the option of antibiotic spacer, antibiotic nail for his infectio
n.

## 2018-01-04 NOTE — PDOC.PN
- Subjective


Encounter Start Date: 01/04/18


Encounter Start Time: 09:10





Patient seen and examined. No new complaints. No overnight events


pt is adament about going for MRI, as he has back pain and he can not tolerate 

MRI





- Objective


Resuscitation Status: 


 











Resuscitation Status           FULL:Full Resuscitation














MAR Reviewed: Yes


Vital Signs & Weight: 


 Vital Signs (12 hours)











  Temp Pulse Resp BP Pulse Ox


 


 01/04/18 09:42   85   


 


 01/04/18 07:35  98.5 F  85  20  112/61  99


 


 01/04/18 03:45  98.0 F  80  16  104/60  100


 


 01/04/18 00:09  97.9 F  78  16  108/62  98








 Weight











Admit Weight                   264 lb 8.864 oz


 


Weight                         264 lb














I&O: 


 











 01/03/18 01/04/18 01/05/18





 06:59 06:59 06:59


 


Intake Total 450 600 


 


Output Total 620  


 


Balance -170 600 











Result Diagrams: 


 01/03/18 06:03





 01/03/18 06:03


Additional Labs: 


 Accuchecks











  01/04/18 01/03/18 01/03/18





  05:36 21:01 12:10


 


POC Glucose  165 H  225 H  219 H














Phys Exam





- Physical Examination


Constitutional: NAD


HEENT: PERRLA, moist MMs, sclera anicteric


Neck: no JVD, supple


Respiratory: no wheezing, no rales, no rhonchi


Cardiovascular: RRR, no significant murmur, no rub


Gastrointestinal: soft, non-tender, no distention, positive bowel sounds


right lower extrimity swelling


Neurological: non-focal, normal sensation


Psychiatric: normal affect, A&O x 3


Skin: no rash, normal turgor





Dx/Plan


(1) Cellulitis of right lower extremity


Code(s): L03.115 - CELLULITIS OF RIGHT LOWER LIMB   Status: Acute   





(2) Anemia of renal disease


Code(s): D63.1 - ANEMIA IN CHRONIC KIDNEY DISEASE   Status: Chronic   





(3) CAD (coronary artery disease)


Code(s): I25.10 - ATHSCL HEART DISEASE OF NATIVE CORONARY ARTERY W/O ANG PCTRS 

  Status: Chronic   


Qualifiers: 


 





(4) DM2 (diabetes mellitus, type 2)


Status: Chronic   


Qualifiers: 


 





(5) HTN (hypertension)


Code(s): I10 - ESSENTIAL (PRIMARY) HYPERTENSION   Status: Chronic   





(6) Neuropathy


Code(s): G62.9 - POLYNEUROPATHY, UNSPECIFIED   Status: Chronic   





(7) Obesity (BMI 30-39.9)


Code(s): E66.9 - OBESITY, UNSPECIFIED   Status: Chronic   





- Plan


cont current plan of care, continue antibiotics





* ID and ortho following


* continue iv antibiotics


* medication reviewed as below


* symptomatic treatment


* follow culture


* will try to get MRI if pt agrees.








Review of Systems





- Review of Systems


Constitutional: negative: fever, chills, sweats, weakness, malaise, other


ENT: negative: Ear Pain, Ear Discharge, Nose Pain, Nose Discharge, Nose 

Congestion, Mouth Pain, Mouth Swelling, Throat Pain, Throat Swelling, Other


Respiratory: negative: Cough, Dry, Shortness of Breath, Hemoptysis, SOB with 

Excertion, Pleuritic Pain, Sputum, Wheezing


Cardiovascular: negative: chest pain, palpitations, orthopnea, paroxysmal 

nocturnal dyspnea, edema, light headedness, other


Gastrointestinal: negative: Nausea, Vomiting, Abdominal Pain, Diarrhea, 

Constipation, Melena, Hematochezia, Other


Genitourinary: negative: Dysuria, Frequency, Incontinence, Hematuria, Retention

, Other


Musculoskeletal: Back Pain, Leg Pain.  negative: Neck Pain, Shoulder Pain, Arm 

Pain, Hand Pain, Foot Pain, Other





- Medications/Allergies


Allergies/Adverse Reactions: 


 Allergies











Allergy/AdvReac Type Severity Reaction Status Date / Time


 


Sulfa (Sulfonamide Allergy   Verified 11/02/17 15:34





Antibiotics)     











Medications: 


 Current Medications





Acetaminophen (Tylenol)  650 mg PO Q4H PRN


   PRN Reason: Headache/Fever or Pain


   Last Admin: 01/04/18 09:43 Dose:  650 mg


Hydrocodone Bitart/Acetaminophen (Norco 5/325)  1 tab PO Q4H PRN


   PRN Reason: Moderate Pain (4-6)


   Last Admin: 01/04/18 06:28 Dose:  1 tab


Alprazolam (Xanax)  0.25 mg PO TID PRN


   PRN Reason: Anxiety


Aspirin (Ecotrin)  81 mg PO DAILY [facility]


   Last Admin: 01/04/18 09:43 Dose:  81 mg


Bumetanide (Bumex)  0.5 mg PO DAILY [facility]


   Last Admin: 01/04/18 09:46 Dose:  0.5 mg


Calcium Acetate (Phoslo)  1,334 mg PO TID-[facility]


   Last Admin: 01/04/18 09:42 Dose:  1,334 mg


Dextrose/Water (Dextrose 50%)  25 gm SLOW IVP PRN PRN


   PRN Reason: Hypoglycemia


Docusate Sodium (Colace)  100 mg PO BID [facility]


   Last Admin: 01/04/18 09:42 Dose:  100 mg


Duloxetine HCl (Cymbalta)  60 mg PO DAILY [facility]


   Last Admin: 01/04/18 09:42 Dose:  60 mg


Famotidine (Pepcid)  20 mg PO DAILY [facility]


   Last Admin: 01/04/18 09:42 Dose:  20 mg


Glucagon (Glucagon)  1 mg IM PRN PRN


   PRN Reason: Hypoglycemia


Heparin Sodium (Porcine) (Heparin)  5,000 units SC TID [facility]


   Last Admin: 01/04/18 09:46 Dose:  5,000 units


Hydralazine HCl (Apresoline)  10 mg SLOW IVP Q4H PRN


   PRN Reason: Systolic BP > 180


Hydralazine HCl (Apresoline)  25 mg PO QID [facility]


   Last Admin: 01/04/18 09:42 Dose:  25 mg


Dextrose/Water (D5w)  1,000 mls @ 0 mls/hr IV .Q0M PRN; As Directed


   PRN Reason: Hypoglycemia


Insulin Detemir 35 units/ (Miscellaneous Medication)  0.35 mls @ 0 mls/hr SC HS 

[facility]


   Last Admin: 01/03/18 20:56 Dose:  0.35 mls


Insulin Detemir 35 units/ (Miscellaneous Medication)  0.35 mls @ 0 mls/hr SC 

QAM [facility]


   Last Admin: 01/04/18 09:43 Dose:  0.35 mls


Vancomycin HCl 1.25 gm/ Sodium (Chloride)  250 mls @ 166.667 mls/hr IVPB 2000 

[facility]


   Last Admin: 01/03/18 20:51 Dose:  250 mls


Cefepime HCl 1 gm/Miscellaneous Medication 1 each/ Sterile Water  10 mls @ 120 

mls/hr SLOW IVP 0600 [facility]


   Last Admin: 01/04/18 06:29 Dose:  10 mls


Insulin Human Lispro (Humalog)  0 units SC .BEDTIME SLIDING SC PRN


   PRN Reason: Bedtime Correctional Scale


Insulin Human Lispro (Humalog)  0 units SC .MODERATE SLIDING SC PRN


   PRN Reason: Moderate Correctional Scale


   Last Admin: 01/04/18 06:26 Dose:  2 unit


Levothyroxine Sodium (Synthroid)  25 mcg PO 0600 [facility]


   Last Admin: 01/04/18 06:28 Dose:  25 mcg


Magnesium Hydroxide (Milk Of Magnesium)  30 ml PO DAILYPRN PRN


   PRN Reason: Constipation


Miscellaneous Medication (Pharmacy To Dose)  1 each IVPB PRN PRN


   PRN Reason: Pharmacy to dose


Nystatin (Mycostatin Ointment)  0 gm TOP DAILY [facility]


   Last Admin: 01/04/18 09:47 Dose:  1 applic


Ondansetron HCl (Zofran Odt)  4 mg PO Q6H PRN


   PRN Reason: Nausea/Vomiting


Ondansetron HCl (Zofran)  4 mg IVP Q6H PRN


   PRN Reason: Nausea/Vomiting


Polyethylene Glycol (Miralax)  17 gm PO DAILY [facility]


   Last Admin: 01/04/18 09:47 Dose:  17 gm


Rosuvastatin Calcium (Crestor)  20 mg PO QAM [facility]


   Last Admin: 01/04/18 09:42 Dose:  20 mg


Sodium Chloride (Flush - Normal Saline)  10 ml IVF Q12HR [facility]


   Last Admin: 01/03/18 20:52 Dose:  10 ml


Sodium Chloride (Flush - Normal Saline)  10 ml IVF PRN PRN


   PRN Reason: Saline Flush


Tamsulosin HCl (Flomax)  0.4 mg PO HS [facility]


   Last Admin: 01/03/18 20:53 Dose:  0.4 mg

## 2018-01-05 RX ADMIN — HEPARIN SODIUM SCH UNITS: 5000 INJECTION, SOLUTION INTRAVENOUS; SUBCUTANEOUS at 17:00

## 2018-01-05 RX ADMIN — VANCOMYCIN HYDROCHLORIDE SCH MLS: 1 INJECTION, POWDER, LYOPHILIZED, FOR SOLUTION INTRAVENOUS at 20:10

## 2018-01-05 RX ADMIN — Medication SCH ML: at 21:08

## 2018-01-05 RX ADMIN — HYDROCODONE BITARTRATE AND ACETAMINOPHEN PRN TAB: 5; 325 TABLET ORAL at 20:22

## 2018-01-05 RX ADMIN — Medication SCH: at 08:43

## 2018-01-05 RX ADMIN — ASPIRIN SCH MG: 81 TABLET ORAL at 08:23

## 2018-01-05 RX ADMIN — Medication PRN ML: at 05:50

## 2018-01-05 RX ADMIN — HYDROCODONE BITARTRATE AND ACETAMINOPHEN PRN TAB: 5; 325 TABLET ORAL at 05:50

## 2018-01-05 RX ADMIN — HEPARIN SODIUM SCH UNITS: 5000 INJECTION, SOLUTION INTRAVENOUS; SUBCUTANEOUS at 08:23

## 2018-01-05 RX ADMIN — INSULIN LISPRO PRN UNIT: 100 INJECTION, SOLUTION INTRAVENOUS; SUBCUTANEOUS at 20:25

## 2018-01-05 RX ADMIN — HEPARIN SODIUM SCH UNITS: 5000 INJECTION, SOLUTION INTRAVENOUS; SUBCUTANEOUS at 20:12

## 2018-01-05 RX ADMIN — HYDROCODONE BITARTRATE AND ACETAMINOPHEN PRN TAB: 5; 325 TABLET ORAL at 16:34

## 2018-01-05 RX ADMIN — CEFEPIME HYDROCHLORIDE SCH MLS: 1 INJECTION, POWDER, FOR SOLUTION INTRAMUSCULAR; INTRAVENOUS at 05:52

## 2018-01-05 NOTE — PDOC.PN
- Subjective


Encounter Start Date: 01/05/18


Encounter Start Time: 07:50





Patient seen and examined. No new complaints. No overnight events





- Objective


Resuscitation Status: 


 











Resuscitation Status           FULL:Full Resuscitation














MAR Reviewed: Yes


Vital Signs & Weight: 


 Vital Signs (12 hours)











  Temp Pulse Resp BP Pulse Ox


 


 01/05/18 08:41   83   


 


 01/05/18 04:35  98.2 F  83  18  111/66  94 L


 


 01/05/18 00:00  98.4 F  78  16  128/68  97








 Weight











Admit Weight                   264 lb 8.864 oz


 


Weight                         264 lb














I&O: 


 











 01/04/18 01/05/18 01/06/18





 06:59 06:59 06:59


 


Intake Total 600 770 


 


Output Total  750 


 


Balance 600 20 











Result Diagrams: 


 01/03/18 06:03





 01/03/18 06:03


Additional Labs: 


 Accuchecks











  01/05/18 01/04/18 01/04/18





  06:16 20:07 16:04


 


POC Glucose  50 L*  133 H  155 H














  01/04/18





  11:46


 


POC Glucose  147 H














Phys Exam





- Physical Examination


Constitutional: NAD


HEENT: PERRLA, moist MMs, sclera anicteric


Neck: no JVD, supple


Respiratory: no wheezing, no rales, no rhonchi


Cardiovascular: RRR, no significant murmur, no rub


Gastrointestinal: soft, non-tender, no distention, positive bowel sounds


Musculoskeletal: no edema, pulses present


right leg cellulitis


Neurological: moves all 4 limbs


Psychiatric: normal affect, A&O x 3


Skin: no rash, normal turgor





Dx/Plan


(1) Cellulitis of right lower extremity


Code(s): L03.115 - CELLULITIS OF RIGHT LOWER LIMB   Status: Acute   





(2) Anemia of renal disease


Code(s): D63.1 - ANEMIA IN CHRONIC KIDNEY DISEASE   Status: Chronic   





(3) CAD (coronary artery disease)


Code(s): I25.10 - ATHSCL HEART DISEASE OF NATIVE CORONARY ARTERY W/O ANG PCTRS 

  Status: Chronic   


Qualifiers: 


 





(4) DM2 (diabetes mellitus, type 2)


Status: Chronic   


Qualifiers: 


 





(5) HTN (hypertension)


Code(s): I10 - ESSENTIAL (PRIMARY) HYPERTENSION   Status: Chronic   





(6) Neuropathy


Code(s): G62.9 - POLYNEUROPATHY, UNSPECIFIED   Status: Chronic   





(7) Obesity (BMI 30-39.9)


Code(s): E66.9 - OBESITY, UNSPECIFIED   Status: Chronic   





- Plan


cont current plan of care, continue antibiotics





* medication reviewed as below


* symptomatic treatment


* continue iv antibiotics as per ID as ordered below


* add fentanyl for pain control


* ortho and ID following








Review of Systems





- Review of Systems


Constitutional: negative: fever, chills, sweats, weakness, malaise, other


Eyes: negative: Pain, Vision Change, Conjunctivae Inflammation, Eyelid 

Inflammation, Redness, Other


ENT: negative: Ear Pain, Ear Discharge, Nose Pain, Nose Discharge, Nose 

Congestion, Mouth Pain, Mouth Swelling, Throat Pain, Throat Swelling, Other


Respiratory: negative: Cough, Dry, Shortness of Breath, Hemoptysis, SOB with 

Excertion, Pleuritic Pain, Sputum, Wheezing


Cardiovascular: negative: chest pain, palpitations, orthopnea, paroxysmal 

nocturnal dyspnea, edema, light headedness, other


Gastrointestinal: negative: Nausea, Vomiting, Abdominal Pain, Diarrhea, 

Constipation, Melena, Hematochezia, Other


Genitourinary: negative: Dysuria, Frequency, Incontinence, Hematuria, Retention

, Other


Musculoskeletal: Back Pain, Leg Pain.  negative: Neck Pain, Shoulder Pain, Arm 

Pain, Hand Pain, Foot Pain, Other





- Medications/Allergies


Allergies/Adverse Reactions: 


 Allergies











Allergy/AdvReac Type Severity Reaction Status Date / Time


 


Sulfa (Sulfonamide Allergy   Verified 11/02/17 15:34





Antibiotics)     











Medications: 


 Current Medications





Acetaminophen (Tylenol)  650 mg PO Q4H PRN


   PRN Reason: Headache/Fever or Pain


   Last Admin: 01/04/18 09:43 Dose:  650 mg


Hydrocodone Bitart/Acetaminophen (Norco 5/325)  1 tab PO Q4H PRN


   PRN Reason: Moderate Pain (4-6)


   Last Admin: 01/05/18 05:50 Dose:  1 tab


Alprazolam (Xanax)  0.25 mg PO TID PRN


   PRN Reason: Anxiety


   Last Admin: 01/05/18 05:52 Dose:  0.25 mg


Aspirin (Ecotrin)  81 mg PO DAILY [facility]


   Last Admin: 01/05/18 08:23 Dose:  81 mg


Bumetanide (Bumex)  0.5 mg PO DAILY [facility]


   Last Admin: 01/05/18 08:42 Dose:  0.5 mg


Calcium Acetate (Phoslo)  1,334 mg PO TID-[facility]


   Last Admin: 01/05/18 08:22 Dose:  1,334 mg


Dextrose/Water (Dextrose 50%)  25 gm SLOW IVP PRN PRN


   PRN Reason: Hypoglycemia


Docusate Sodium (Colace)  100 mg PO BID [facility]


   Last Admin: 01/05/18 08:23 Dose:  100 mg


Duloxetine HCl (Cymbalta)  60 mg PO DAILY [facility]


   Last Admin: 01/05/18 08:22 Dose:  60 mg


Famotidine (Pepcid)  20 mg PO DAILY [facility]


   Last Admin: 01/05/18 08:23 Dose:  20 mg


Fentanyl (Duragesic)  25 mcg TD Q3D [facility]


Glucagon (Glucagon)  1 mg IM PRN PRN


   PRN Reason: Hypoglycemia


Guaifenesin (Robitussin Sf)  200 mg PO TIDPRN PRN


   PRN Reason: Cough


   Last Admin: 01/04/18 22:53 Dose:  200 mg


Heparin Sodium (Porcine) (Heparin)  5,000 units SC TID [facility]


   Last Admin: 01/05/18 08:23 Dose:  5,000 units


Hydralazine HCl (Apresoline)  10 mg SLOW IVP Q4H PRN


   PRN Reason: Systolic BP > 180


Hydralazine HCl (Apresoline)  25 mg PO QID [facility]


   Last Admin: 01/05/18 08:41 Dose:  Not Given


Dextrose/Water (D5w)  1,000 mls @ 0 mls/hr IV .Q0M PRN; As Directed


   PRN Reason: Hypoglycemia


Vancomycin HCl 1.25 gm/ Sodium (Chloride)  250 mls @ 166.667 mls/hr IVPB 2000 

[facility]


   Last Admin: 01/04/18 19:59 Dose:  250 mls


Cefepime HCl 1 gm/Miscellaneous Medication 1 each/ Sterile Water  10 mls @ 120 

mls/hr SLOW IVP 0600 [facility]


   Last Admin: 01/05/18 05:52 Dose:  10 mls


Insulin Detemir 20 units/ (Miscellaneous Medication)  0.2 mls @ 0 mls/hr SC HS 

[facility]


Insulin Detemir 25 units/ (Miscellaneous Medication)  0.25 mls @ 0 mls/hr SC 

QAM [facility]


   Last Admin: 01/05/18 08:41 Dose:  Not Given


Insulin Human Lispro (Humalog)  0 units SC .BEDTIME SLIDING SC PRN


   PRN Reason: Bedtime Correctional Scale


Insulin Human Lispro (Humalog)  0 units SC .MODERATE SLIDING SC PRN


   PRN Reason: Moderate Correctional Scale


   Last Admin: 01/04/18 06:26 Dose:  2 unit


Levothyroxine Sodium (Synthroid)  25 mcg PO 0600 [facility]


   Last Admin: 01/05/18 05:51 Dose:  25 mcg


Magnesium Hydroxide (Milk Of Magnesium)  30 ml PO DAILYPRN PRN


   PRN Reason: Constipation


   Last Admin: 01/04/18 19:59 Dose:  30 ml


Miscellaneous Medication (Pharmacy To Dose)  1 each IVPB PRN PRN


   PRN Reason: Pharmacy to dose


Nystatin (Mycostatin Ointment)  0 gm TOP DAILY [facility]


   Last Admin: 01/05/18 08:42 Dose:  1 applic


Ondansetron HCl (Zofran Odt)  4 mg PO Q6H PRN


   PRN Reason: Nausea/Vomiting


Ondansetron HCl (Zofran)  4 mg IVP Q6H PRN


   PRN Reason: Nausea/Vomiting


Polyethylene Glycol (Miralax)  17 gm PO DAILY [facility]


   Last Admin: 01/05/18 08:22 Dose:  17 gm


Rosuvastatin Calcium (Crestor)  20 mg PO QAM [facility]


   Last Admin: 01/05/18 08:22 Dose:  20 mg


Sodium Chloride (Flush - Normal Saline)  10 ml IVF Q12HR [facility]


   Last Admin: 01/05/18 08:43 Dose:  Not Given


Sodium Chloride (Flush - Normal Saline)  10 ml IVF PRN PRN


   PRN Reason: Saline Flush


   Last Admin: 01/05/18 05:50 Dose:  10 ml


Tamsulosin HCl (Flomax)  0.4 mg PO HS [facility]


   Last Admin: 01/04/18 19:59 Dose:  0.4 mg

## 2018-01-06 RX ADMIN — INSULIN LISPRO PRN UNIT: 100 INJECTION, SOLUTION INTRAVENOUS; SUBCUTANEOUS at 07:13

## 2018-01-06 RX ADMIN — HEPARIN SODIUM SCH UNITS: 5000 INJECTION, SOLUTION INTRAVENOUS; SUBCUTANEOUS at 22:10

## 2018-01-06 RX ADMIN — INSULIN LISPRO PRN UNIT: 100 INJECTION, SOLUTION INTRAVENOUS; SUBCUTANEOUS at 12:50

## 2018-01-06 RX ADMIN — INSULIN LISPRO PRN UNIT: 100 INJECTION, SOLUTION INTRAVENOUS; SUBCUTANEOUS at 21:47

## 2018-01-06 RX ADMIN — Medication SCH ML: at 09:39

## 2018-01-06 RX ADMIN — Medication SCH ML: at 22:10

## 2018-01-06 RX ADMIN — HEPARIN SODIUM SCH UNITS: 5000 INJECTION, SOLUTION INTRAVENOUS; SUBCUTANEOUS at 09:39

## 2018-01-06 RX ADMIN — HYDROCODONE BITARTRATE AND ACETAMINOPHEN PRN TAB: 5; 325 TABLET ORAL at 03:37

## 2018-01-06 RX ADMIN — ASPIRIN SCH MG: 81 TABLET ORAL at 09:38

## 2018-01-06 RX ADMIN — HYDROCODONE BITARTRATE AND ACETAMINOPHEN PRN TAB: 5; 325 TABLET ORAL at 21:38

## 2018-01-06 RX ADMIN — INSULIN LISPRO PRN UNIT: 100 INJECTION, SOLUTION INTRAVENOUS; SUBCUTANEOUS at 17:11

## 2018-01-06 RX ADMIN — HYDROCODONE BITARTRATE AND ACETAMINOPHEN PRN TAB: 5; 325 TABLET ORAL at 09:38

## 2018-01-06 RX ADMIN — VANCOMYCIN HYDROCHLORIDE SCH MLS: 1 INJECTION, POWDER, LYOPHILIZED, FOR SOLUTION INTRAVENOUS at 21:43

## 2018-01-06 RX ADMIN — CEFEPIME HYDROCHLORIDE SCH MLS: 1 INJECTION, POWDER, FOR SOLUTION INTRAMUSCULAR; INTRAVENOUS at 06:36

## 2018-01-06 RX ADMIN — HEPARIN SODIUM SCH UNITS: 5000 INJECTION, SOLUTION INTRAVENOUS; SUBCUTANEOUS at 15:02

## 2018-01-06 NOTE — PDOC.PN
- Subjective


Encounter Start Date: 01/06/18


Encounter Start Time: 13:44





Patient seen and examined, no new issues, all questions answered.





- Objective


Resuscitation Status: 


 











Resuscitation Status           FULL:Full Resuscitation














Vital Signs & Weight: 


 Vital Signs (12 hours)











  Temp Pulse Resp BP Pulse Ox


 


 01/06/18 12:34  98 F  87  20  136/72  100


 


 01/06/18 09:38   84   


 


 01/06/18 09:05  97.4 F L  84  16  114/69  99


 


 01/06/18 08:00  98.2 F  84  16  


 


 01/06/18 04:00  98.0 F  83  19  132/70  99








 Weight











Admit Weight                   264 lb 8.864 oz


 


Weight                         264 lb














I&O: 


 











 01/05/18 01/06/18 01/07/18





 06:59 06:59 06:59


 


Intake Total 770 650 


 


Output Total 750 2300 


 


Balance 20 -1650 











Result Diagrams: 


 01/03/18 06:03





 01/03/18 06:03


Additional Labs: 


 Accuchecks











  01/06/18 01/06/18 01/05/18





  11:17 05:46 19:40


 


POC Glucose  377 H  376 H  377 H














Phys Exam





- Physical Examination


Constitutional: NAD


HEENT: PERRLA, moist MMs


Neck: no nodes, no JVD


Respiratory: no wheezing, no rales


Cardiovascular: RRR, no significant murmur


Gastrointestinal: soft, non-tender


Musculoskeletal: pulses present


RLE in bandages


Neurological: non-focal, normal sensation





Dx/Plan


(1) Cellulitis of right lower extremity


Code(s): L03.115 - CELLULITIS OF RIGHT LOWER LIMB   Status: Acute   





(2) Hypoglycemia associated with type 2 diabetes mellitus


Code(s): E11.649 - TYPE 2 DIABETES MELLITUS WITH HYPOGLYCEMIA WITHOUT COMA   

Status: Acute   





(3) Osteomyelitis of right tibia


Code(s): M86.9 - OSTEOMYELITIS, UNSPECIFIED   Status: Acute   





(4) Anemia of renal disease


Code(s): D63.1 - ANEMIA IN CHRONIC KIDNEY DISEASE   Status: Chronic   





(5) HTN (hypertension)


Code(s): I10 - ESSENTIAL (PRIMARY) HYPERTENSION   Status: Chronic   





(6) Neuropathy


Code(s): G62.9 - POLYNEUROPATHY, UNSPECIFIED   Status: Chronic   





(7) Obesity (BMI 30-39.9)


Code(s): E66.9 - OBESITY, UNSPECIFIED   Status: Chronic   





- Plan





* cont w/ current plan of care


* possible hardware removal early next week


* no other changes

## 2018-01-07 LAB
ANION GAP SERPL CALC-SCNC: 15 MMOL/L (ref 10–20)
BUN SERPL-MCNC: 52 MG/DL (ref 8.4–25.7)
CALCIUM SERPL-MCNC: 9.7 MG/DL (ref 7.8–10.44)
CHLORIDE SERPL-SCNC: 101 MMOL/L (ref 98–107)
CO2 SERPL-SCNC: 24 MMOL/L (ref 23–31)
CREAT CL PREDICTED SERPL C-G-VRATE: 46 ML/MIN (ref 70–130)
GLUCOSE SERPL-MCNC: 443 MG/DL (ref 80–115)
HGB BLD-MCNC: 9.3 G/DL (ref 14–18)
MCH RBC QN AUTO: 28.7 PG (ref 27–31)
MCV RBC AUTO: 89.2 FL (ref 80–94)
PLATELET # BLD AUTO: 532 THOU/UL (ref 130–400)
POTASSIUM SERPL-SCNC: 5 MMOL/L (ref 3.5–5.1)
RBC # BLD AUTO: 3.25 MILL/UL (ref 4.7–6.1)
SODIUM SERPL-SCNC: 135 MMOL/L (ref 136–145)
VANCOMYCIN TROUGH SERPL-MCNC: 28.3 UG/ML
WBC # BLD AUTO: 9.8 THOU/UL (ref 4.8–10.8)

## 2018-01-07 RX ADMIN — INSULIN LISPRO PRN UNIT: 100 INJECTION, SOLUTION INTRAVENOUS; SUBCUTANEOUS at 12:13

## 2018-01-07 RX ADMIN — HYDROCODONE BITARTRATE AND ACETAMINOPHEN PRN TAB: 5; 325 TABLET ORAL at 11:26

## 2018-01-07 RX ADMIN — ASPIRIN SCH MG: 81 TABLET ORAL at 09:10

## 2018-01-07 RX ADMIN — HYDROCODONE BITARTRATE AND ACETAMINOPHEN PRN TAB: 5; 325 TABLET ORAL at 23:40

## 2018-01-07 RX ADMIN — INSULIN LISPRO PRN UNIT: 100 INJECTION, SOLUTION INTRAVENOUS; SUBCUTANEOUS at 05:52

## 2018-01-07 RX ADMIN — HEPARIN SODIUM SCH UNITS: 5000 INJECTION, SOLUTION INTRAVENOUS; SUBCUTANEOUS at 21:31

## 2018-01-07 RX ADMIN — CEFEPIME HYDROCHLORIDE SCH MLS: 1 INJECTION, POWDER, FOR SOLUTION INTRAMUSCULAR; INTRAVENOUS at 05:42

## 2018-01-07 RX ADMIN — Medication SCH ML: at 09:21

## 2018-01-07 RX ADMIN — HYDROCODONE BITARTRATE AND ACETAMINOPHEN PRN TAB: 5; 325 TABLET ORAL at 16:57

## 2018-01-07 RX ADMIN — HEPARIN SODIUM SCH UNITS: 5000 INJECTION, SOLUTION INTRAVENOUS; SUBCUTANEOUS at 09:11

## 2018-01-07 RX ADMIN — HYDROCODONE BITARTRATE AND ACETAMINOPHEN PRN TAB: 5; 325 TABLET ORAL at 03:24

## 2018-01-07 RX ADMIN — Medication SCH ML: at 21:33

## 2018-01-07 RX ADMIN — INSULIN LISPRO PRN UNIT: 100 INJECTION, SOLUTION INTRAVENOUS; SUBCUTANEOUS at 16:54

## 2018-01-07 RX ADMIN — INSULIN LISPRO PRN UNIT: 100 INJECTION, SOLUTION INTRAVENOUS; SUBCUTANEOUS at 21:33

## 2018-01-07 RX ADMIN — HEPARIN SODIUM SCH UNITS: 5000 INJECTION, SOLUTION INTRAVENOUS; SUBCUTANEOUS at 14:24

## 2018-01-07 NOTE — PDOC.PN
- Subjective


Encounter Start Date: 01/07/18


Encounter Start Time: 13:27





Patient seen and examined, no new issues or complaints





- Objective


Resuscitation Status: 


 











Resuscitation Status           FULL:Full Resuscitation














Vital Signs & Weight: 


 Vital Signs (12 hours)











  Temp Pulse Resp BP BP Pulse Ox


 


 01/07/18 12:07   90   143/69 H  


 


 01/07/18 12:00  97.9 F  89  21 H   143/69 H  93 L


 


 01/07/18 09:12   85   128/78  


 


 01/07/18 08:00  97.8 F  85  21 H   128/70  97


 


 01/07/18 04:38  97.9 F  79  16   129/67  99








 Weight











Admit Weight                   264 lb 8.864 oz


 


Weight                         264 lb














I&O: 


 











 01/06/18 01/07/18 01/08/18





 06:59 06:59 06:59


 


Intake Total 650 480 


 


Output Total 2300 1975 


 


Balance -2979 -8394 











Result Diagrams: 


 01/07/18 09:54





 01/07/18 09:54


Additional Labs: 


 Accuchecks











  01/07/18 01/07/18 01/07/18





  12:12 12:06 05:52


 


POC Glucose  461 H  438 H  399 H














  01/06/18 01/06/18





  21:12 16:58


 


POC Glucose  439 H  419 H














Phys Exam





- Physical Examination


Constitutional: NAD


HEENT: PERRLA, moist MMs


Neck: no nodes, no JVD


Respiratory: no wheezing, no rales


Cardiovascular: RRR, no significant murmur


Gastrointestinal: soft, non-tender, no distention


Musculoskeletal: pulses present, edema present


RLE bandaged


Neurological: non-focal, normal sensation


Psychiatric: normal affect, A&O x 3





Dx/Plan


(1) Cellulitis of right lower extremity


Code(s): L03.115 - CELLULITIS OF RIGHT LOWER LIMB   Status: Acute   





(2) Hypoglycemia associated with type 2 diabetes mellitus


Code(s): E11.649 - TYPE 2 DIABETES MELLITUS WITH HYPOGLYCEMIA WITHOUT COMA   

Status: Acute   





(3) Osteomyelitis of right tibia


Code(s): M86.9 - OSTEOMYELITIS, UNSPECIFIED   Status: Acute   





(4) Anemia of renal disease


Code(s): D63.1 - ANEMIA IN CHRONIC KIDNEY DISEASE   Status: Chronic   





(5) HTN (hypertension)


Code(s): I10 - ESSENTIAL (PRIMARY) HYPERTENSION   Status: Chronic   





(6) Neuropathy


Code(s): G62.9 - POLYNEUROPATHY, UNSPECIFIED   Status: Chronic   





(7) Obesity (BMI 30-39.9)


Code(s): E66.9 - OBESITY, UNSPECIFIED   Status: Chronic   





- Plan





* pending hardware removal


* blood sugars elevated, will increase levemir to 25 units BID for now


* will also increase sliding scale to high dose


* continue abx


* no other changes in plan for now


* case and plan d/w patient at length, he understands and agrees with this plan

## 2018-01-08 LAB — VANCOMYCIN SERPL-MCNC: 22.8 UG/ML

## 2018-01-08 RX ADMIN — INSULIN LISPRO PRN UNIT: 100 INJECTION, SOLUTION INTRAVENOUS; SUBCUTANEOUS at 20:32

## 2018-01-08 RX ADMIN — HEPARIN SODIUM SCH UNITS: 5000 INJECTION, SOLUTION INTRAVENOUS; SUBCUTANEOUS at 16:11

## 2018-01-08 RX ADMIN — HYDROCODONE BITARTRATE AND ACETAMINOPHEN PRN TAB: 5; 325 TABLET ORAL at 19:40

## 2018-01-08 RX ADMIN — INSULIN LISPRO PRN UNIT: 100 INJECTION, SOLUTION INTRAVENOUS; SUBCUTANEOUS at 06:23

## 2018-01-08 RX ADMIN — Medication SCH ML: at 09:52

## 2018-01-08 RX ADMIN — CEFEPIME HYDROCHLORIDE SCH MLS: 1 INJECTION, POWDER, FOR SOLUTION INTRAMUSCULAR; INTRAVENOUS at 06:23

## 2018-01-08 RX ADMIN — HYDROCODONE BITARTRATE AND ACETAMINOPHEN PRN TAB: 5; 325 TABLET ORAL at 15:44

## 2018-01-08 RX ADMIN — HEPARIN SODIUM SCH UNITS: 5000 INJECTION, SOLUTION INTRAVENOUS; SUBCUTANEOUS at 09:52

## 2018-01-08 RX ADMIN — INSULIN LISPRO PRN UNIT: 100 INJECTION, SOLUTION INTRAVENOUS; SUBCUTANEOUS at 18:05

## 2018-01-08 RX ADMIN — ASPIRIN SCH MG: 81 TABLET ORAL at 09:27

## 2018-01-08 RX ADMIN — HYDROCODONE BITARTRATE AND ACETAMINOPHEN PRN TAB: 5; 325 TABLET ORAL at 04:19

## 2018-01-08 RX ADMIN — HEPARIN SODIUM SCH: 5000 INJECTION, SOLUTION INTRAVENOUS; SUBCUTANEOUS at 22:59

## 2018-01-08 RX ADMIN — Medication SCH ML: at 20:37

## 2018-01-08 NOTE — PRG
HISTORY OF PRESENT ILLNESS:  Mr. Sanchez is a 67-year-old male who has a long 
history of treatment to include hardware placement followed by hardware removal 
and patient has history of diabetes.  The patient presents with right leg 
cellulitis.  The patient was also been treated at [facility] prior to this.  
The patient has done a skin grafting by Dr. Kim and allograft from Dr. Kim for draining sinus.  The patient now presents with minimal pain with 
cellulitis.  The patient has had hardware in place, but has showed signs of 
healing.  I have planned to try to wait as long as possible to remove it.  
Given this, the patient is being planned for a hardware removal.

 

PHYSICAL EXAMINATION:

VITAL SIGNS:  Afebrile.  Stable.

GENERAL:  Alert and oriented, in no acute distress.

EXTREMITIES:  eschar anterior knee, he has got unchanged sensory changes of the 
right lower extremity.  The patient has minimal range of motion of his knee and 
tibia.  The patient has no gross instability of his proximal tibia fracture.

 

LABORATORY DATA AND IMAGING:  Radiographs of his knee shows a woven bone callus 
formation healing with previous staples in place and no signs of failure.  The 
patient's labs had elevated CRP, normal white blood cell count.  The patient 
had a bone scan, which concerned for possible acute healing of the fracture 
versus osteomyelitis.  I did not see any obvious sequestrum or involucrum.

 

ASSESSMENT AND PLAN:  The patient will be taken to the OR tomorrow to have his 
hardware removed and reaming the canal.  I have placed antibiotic spacer versus 
removing it, allowing the wound heal secondary intent with long-term IV 
antibiotics.  Take cultures at that time with the bone biopsy.  Patient 
understands the risks and benefits and elected to proceed.

 

YONNY

## 2018-01-08 NOTE — PDOC.PN
- Subjective


Encounter Start Date: 01/08/18


Encounter Start Time: 08:15


-: old records requested/rev





Patient seen and examined. No new complaints. No overnight events





- Objective


Resuscitation Status: 


 











Resuscitation Status           FULL:Full Resuscitation














MAR Reviewed: Yes


Vital Signs & Weight: 


 Vital Signs (12 hours)











  Temp Pulse Resp BP Pulse Ox


 


 01/08/18 09:27   86   


 


 01/08/18 08:00  97.6 F  86  18  131/69  97


 


 01/08/18 04:00  98.3 F  83  16  135/72  95








 Weight











Admit Weight                   264 lb 8.864 oz


 


Weight                         264 lb














I&O: 


 











 01/07/18 01/08/18 01/09/18





 06:59 06:59 06:59


 


Intake Total 480 1600 


 


Output Total 1975 2000 


 


Balance -1495 -400 











Result Diagrams: 


 01/07/18 09:54





 01/07/18 09:54


Additional Labs: 


 Accuchecks











  01/08/18 01/07/18 01/07/18





  05:34 20:11 12:12


 


POC Glucose  216 H  315 H  461 H














  01/07/18





  12:06


 


POC Glucose  438 H














Phys Exam





- Physical Examination


Constitutional: NAD


HEENT: PERRLA, moist MMs, sclera anicteric


Neck: no JVD, supple


Respiratory: no wheezing, no rales, no rhonchi


Cardiovascular: RRR, no significant murmur, no rub


Gastrointestinal: soft, non-tender, no distention, positive bowel sounds


Musculoskeletal: no edema, pulses present


Neurological: non-focal, normal sensation, moves all 4 limbs


Lymphatic: no nodes


Psychiatric: normal affect, A&O x 3


Skin: no rash, normal turgor





Dx/Plan


(1) Cellulitis of right lower extremity


Code(s): L03.115 - CELLULITIS OF RIGHT LOWER LIMB   Status: Acute   





(2) Anemia of renal disease


Code(s): D63.1 - ANEMIA IN CHRONIC KIDNEY DISEASE   Status: Chronic   





(3) CAD (coronary artery disease)


Code(s): I25.10 - ATHSCL HEART DISEASE OF NATIVE CORONARY ARTERY W/O ANG PCTRS 

  Status: Chronic   


Qualifiers: 


 





(4) DM2 (diabetes mellitus, type 2)


Status: Chronic   


Qualifiers: 


 





(5) HTN (hypertension)


Code(s): I10 - ESSENTIAL (PRIMARY) HYPERTENSION   Status: Chronic   





(6) Neuropathy


Code(s): G62.9 - POLYNEUROPATHY, UNSPECIFIED   Status: Chronic   





(7) Obesity (BMI 30-39.9)


Code(s): E66.9 - OBESITY, UNSPECIFIED   Status: Chronic   





- Plan


cont current plan of care, continue antibiotics





* tomorrow plan for hardware removal


* continue iv antibiotics as per ID as ordered below


* medication reviewed as below


* symptomatic treatment.








Review of Systems





- Review of Systems


ENT: negative: Ear Pain, Ear Discharge, Nose Pain, Nose Discharge, Nose 

Congestion, Mouth Pain, Mouth Swelling, Throat Pain, Throat Swelling, Other


Respiratory: negative: Cough, Dry, Shortness of Breath, Hemoptysis, SOB with 

Excertion, Pleuritic Pain, Sputum, Wheezing


Cardiovascular: negative: chest pain, palpitations, orthopnea, paroxysmal 

nocturnal dyspnea, edema, light headedness, other


Gastrointestinal: negative: Nausea, Vomiting, Abdominal Pain, Diarrhea, 

Constipation, Melena, Hematochezia, Other


Genitourinary: negative: Dysuria, Frequency, Incontinence, Hematuria, Retention

, Other


Musculoskeletal: negative: Neck Pain, Shoulder Pain, Arm Pain, Back Pain, Hand 

Pain, Leg Pain, Foot Pain, Other


Skin: negative: Rash, Lesions, Per, Bruising, Other





- Medications/Allergies


Allergies/Adverse Reactions: 


 Allergies











Allergy/AdvReac Type Severity Reaction Status Date / Time


 


Sulfa (Sulfonamide Allergy   Verified 11/02/17 15:34





Antibiotics)     











Medications: 


 Current Medications





Acetaminophen (Tylenol)  650 mg PO Q4H PRN


   PRN Reason: Headache/Fever or Pain


   Last Admin: 01/04/18 09:43 Dose:  650 mg


Hydrocodone Bitart/Acetaminophen (Norco 5/325)  1 tab PO Q4H PRN


   PRN Reason: Moderate Pain (4-6)


   Last Admin: 01/08/18 04:19 Dose:  1 tab


Al Hydroxide/Mg Hydroxide (Maalox)  15 ml PO Q4H PRN


   PRN Reason: Heartburn  or Indigestion


Alprazolam (Xanax)  0.25 mg PO TID PRN


   PRN Reason: Anxiety


   Last Admin: 01/07/18 21:31 Dose:  0.25 mg


Artificial Tears (Tears Renewed 15ml Bottle)  0 drop EA EYE PRN PRN


   PRN Reason: Dry Eyes


Aspirin (Ecotrin)  81 mg PO DAILY [facility]


   Last Admin: 01/08/18 09:27 Dose:  81 mg


Bumetanide (Bumex)  0.5 mg PO DAILY [facility]


   Last Admin: 01/08/18 09:52 Dose:  0.5 mg


Calcium Acetate (Phoslo)  1,334 mg PO TID-[facility]


   Last Admin: 01/08/18 09:27 Dose:  1,334 mg


Dextrose/Water (Dextrose 50%)  25 gm SLOW IVP PRN PRN


   PRN Reason: Hypoglycemia


Docusate Sodium (Colace)  100 mg PO BID [facility]


   Last Admin: 01/08/18 09:27 Dose:  100 mg


Duloxetine HCl (Cymbalta)  60 mg PO DAILY [facility]


   Last Admin: 01/08/18 09:28 Dose:  60 mg


Famotidine (Pepcid)  20 mg PO DAILY [facility]


   Last Admin: 01/08/18 09:28 Dose:  20 mg


Fentanyl (Duragesic)  25 mcg TD Q3D [facility]


   Last Admin: 01/05/18 11:02 Dose:  25 mcg


Glucagon (Glucagon)  1 mg IM PRN PRN


   PRN Reason: Hypoglycemia


Guaifenesin (Robitussin Sf)  200 mg PO TIDPRN PRN


   PRN Reason: Cough


   Last Admin: 01/04/18 22:53 Dose:  200 mg


Heparin Sodium (Porcine) (Heparin)  5,000 units SC TID [facility]


   Last Admin: 01/08/18 09:52 Dose:  5,000 units


Hydralazine HCl (Apresoline)  10 mg SLOW IVP Q4H PRN


   PRN Reason: Systolic BP > 180


Hydralazine HCl (Apresoline)  25 mg PO QID [facility]


   Last Admin: 01/08/18 09:27 Dose:  25 mg


Dextrose/Water (D5w)  1,000 mls @ 0 mls/hr IV .Q0M PRN; As Directed


   PRN Reason: Hypoglycemia


Cefepime HCl 1 gm/Miscellaneous Medication 1 each/ Sterile Water  10 mls @ 120 

mls/hr SLOW IVP 0600 [facility]


   Last Admin: 01/08/18 06:23 Dose:  10 mls


Insulin Detemir 25 units/ (Miscellaneous Medication)  0.25 mls @ 0 mls/hr SC 

QAM [facility]


   Last Admin: 01/08/18 09:28 Dose:  0.25 mls


Insulin Detemir 25 units/ (Miscellaneous Medication)  0.25 mls @ 0 mls/hr SC HS 

[facility]


   Last Admin: 01/07/18 21:32 Dose:  0.25 mls


Vancomycin HCl 1 gm/ Device  200 mls @ 200 mls/hr IVPB 2100 [facility]


Insulin Human Lispro (Humalog)  0 units SC .BEDTIME SLIDING SC PRN


   PRN Reason: Bedtime Correctional Scale


   Last Admin: 01/07/18 21:33 Dose:  4 unit


Insulin Human Lispro (Humalog)  0 units SC .AGGRESSIVE SLIDING PRN; Protocol


   PRN Reason: AGGRESSIVE SLIDING SCALE


   Last Admin: 01/08/18 06:23 Dose:  6 unit


Levothyroxine Sodium (Synthroid)  25 mcg PO 0600 [facility]


   Last Admin: 01/08/18 06:23 Dose:  25 mcg


Loratadine (Claritin)  10 mg PO DAILYPRN PRN


   PRN Reason: Sinus Symptoms


Magnesium Hydroxide (Milk Of Magnesium)  30 ml PO DAILYPRN PRN


   PRN Reason: Constipation


   Last Admin: 01/04/18 19:59 Dose:  30 ml


Mineral Oil/White Petrolatum (Eucerin Cream)  0 gm TOP BIDPRN PRN


   PRN Reason: Dry Skin


Miscellaneous Medication (Pharmacy To Dose)  1 each IVPB PRN PRN


   PRN Reason: Pharmacy to dose


Nystatin (Mycostatin Ointment)  0 gm TOP DAILY [facility]


   Last Admin: 01/08/18 09:28 Dose:  1 applic


Ondansetron HCl (Zofran Odt)  4 mg PO Q6H PRN


   PRN Reason: Nausea/Vomiting


Ondansetron HCl (Zofran)  4 mg IVP Q6H PRN


   PRN Reason: Nausea/Vomiting


Phenol (Chloraseptic Spray 180 Ml Bot)  0 ml PO PRN PRN


   PRN Reason: Sore Throat


Polyethylene Glycol (Miralax)  17 gm PO DAILY [facility]


   Last Admin: 01/08/18 09:28 Dose:  17 gm


Rosuvastatin Calcium (Crestor)  20 mg PO QAM [facility]


   Last Admin: 01/08/18 09:27 Dose:  20 mg


Sodium Chloride (Flush - Normal Saline)  10 ml IVF Q12HR [facility]


   Last Admin: 01/08/18 09:52 Dose:  10 ml


Sodium Chloride (Flush - Normal Saline)  10 ml IVF PRN PRN


   PRN Reason: Saline Flush


   Last Admin: 01/05/18 05:50 Dose:  10 ml


Sodium Chloride (Ocean Nasal Spray 0.65%)  0 ml EA NARE QIDPRN PRN


   PRN Reason: Nasal Congestion


Tamsulosin HCl (Flomax)  0.4 mg PO HS [facility]


   Last Admin: 01/07/18 21:31 Dose:  0.4 mg

## 2018-01-09 LAB
ALBUMIN SERPL BCG-MCNC: 2.5 G/DL (ref 3.4–4.8)
ALP SERPL-CCNC: 110 U/L (ref 40–150)
ALT SERPL W P-5'-P-CCNC: 8 U/L (ref 8–55)
ANION GAP SERPL CALC-SCNC: 14 MMOL/L (ref 10–20)
APTT PPP: 29.7 SEC (ref 22.9–36.1)
AST SERPL-CCNC: 10 U/L (ref 5–34)
BASOPHILS # BLD AUTO: 0 THOU/UL (ref 0–0.2)
BASOPHILS NFR BLD AUTO: 0.3 % (ref 0–1)
BILIRUB SERPL-MCNC: 0.3 MG/DL (ref 0.2–1.2)
BUN SERPL-MCNC: 42 MG/DL (ref 8.4–25.7)
CALCIUM SERPL-MCNC: 9.3 MG/DL (ref 7.8–10.44)
CHLORIDE SERPL-SCNC: 100 MMOL/L (ref 98–107)
CO2 SERPL-SCNC: 24 MMOL/L (ref 23–31)
CREAT CL PREDICTED SERPL C-G-VRATE: 51 ML/MIN (ref 70–130)
CRP SERPL-MCNC: 4.55 MG/DL
EOSINOPHIL # BLD AUTO: 0.2 THOU/UL (ref 0–0.7)
EOSINOPHIL NFR BLD AUTO: 2.3 % (ref 0–10)
GLOBULIN SER CALC-MCNC: 4.4 G/DL (ref 2.4–3.5)
GLUCOSE SERPL-MCNC: 291 MG/DL (ref 80–115)
HGB BLD-MCNC: 8.9 G/DL (ref 14–18)
INR PPP: 1.1
LYMPHOCYTES # BLD: 1.9 THOU/UL (ref 1.2–3.4)
LYMPHOCYTES NFR BLD AUTO: 21 % (ref 21–51)
MCH RBC QN AUTO: 27.9 PG (ref 27–31)
MCV RBC AUTO: 88.1 FL (ref 80–94)
MONOCYTES # BLD AUTO: 0.5 THOU/UL (ref 0.11–0.59)
MONOCYTES NFR BLD AUTO: 5.9 % (ref 0–10)
NEUTROPHILS # BLD AUTO: 6.3 THOU/UL (ref 1.4–6.5)
NEUTROPHILS NFR BLD AUTO: 70.5 % (ref 42–75)
PLATELET # BLD AUTO: 498 THOU/UL (ref 130–400)
POTASSIUM SERPL-SCNC: 4.4 MMOL/L (ref 3.5–5.1)
PROTHROMBIN TIME: 14.4 SEC (ref 12–14.7)
RBC # BLD AUTO: 3.2 MILL/UL (ref 4.7–6.1)
SODIUM SERPL-SCNC: 134 MMOL/L (ref 136–145)
VANCOMYCIN SERPL-MCNC: 18 UG/ML
WBC # BLD AUTO: 9 THOU/UL (ref 4.8–10.8)

## 2018-01-09 PROCEDURE — 0QBG0ZX EXCISION OF RIGHT TIBIA, OPEN APPROACH, DIAGNOSTIC: ICD-10-PCS | Performed by: ORTHOPAEDIC SURGERY

## 2018-01-09 PROCEDURE — 0QPG04Z REMOVAL OF INTERNAL FIXATION DEVICE FROM RIGHT TIBIA, OPEN APPROACH: ICD-10-PCS | Performed by: ORTHOPAEDIC SURGERY

## 2018-01-09 PROCEDURE — 0QHG06Z INSERTION OF INTRAMEDULLARY INTERNAL FIXATION DEVICE INTO RIGHT TIBIA, OPEN APPROACH: ICD-10-PCS | Performed by: ORTHOPAEDIC SURGERY

## 2018-01-09 PROCEDURE — 0YHH0YZ INSERTION OF OTHER DEVICE INTO RIGHT LOWER LEG, OPEN APPROACH: ICD-10-PCS | Performed by: ORTHOPAEDIC SURGERY

## 2018-01-09 RX ADMIN — Medication PRN ML: at 06:11

## 2018-01-09 RX ADMIN — HYDROCODONE BITARTRATE AND ACETAMINOPHEN PRN TAB: 5; 325 TABLET ORAL at 19:45

## 2018-01-09 RX ADMIN — Medication SCH: at 08:58

## 2018-01-09 RX ADMIN — HEPARIN SODIUM SCH: 5000 INJECTION, SOLUTION INTRAVENOUS; SUBCUTANEOUS at 08:58

## 2018-01-09 RX ADMIN — HYDROCODONE BITARTRATE AND ACETAMINOPHEN PRN TAB: 5; 325 TABLET ORAL at 14:28

## 2018-01-09 RX ADMIN — HYDROCODONE BITARTRATE AND ACETAMINOPHEN PRN TAB: 5; 325 TABLET ORAL at 00:06

## 2018-01-09 RX ADMIN — HEPARIN SODIUM SCH UNITS: 5000 INJECTION, SOLUTION INTRAVENOUS; SUBCUTANEOUS at 14:28

## 2018-01-09 RX ADMIN — ASPIRIN SCH MG: 81 TABLET ORAL at 14:17

## 2018-01-09 RX ADMIN — Medication SCH ML: at 21:20

## 2018-01-09 RX ADMIN — HEPARIN SODIUM SCH UNITS: 5000 INJECTION, SOLUTION INTRAVENOUS; SUBCUTANEOUS at 21:18

## 2018-01-09 RX ADMIN — CEFEPIME HYDROCHLORIDE SCH MLS: 1 INJECTION, POWDER, FOR SOLUTION INTRAMUSCULAR; INTRAVENOUS at 06:10

## 2018-01-09 RX ADMIN — INSULIN LISPRO PRN UNIT: 100 INJECTION, SOLUTION INTRAVENOUS; SUBCUTANEOUS at 22:41

## 2018-01-09 RX ADMIN — INSULIN LISPRO PRN UNIT: 100 INJECTION, SOLUTION INTRAVENOUS; SUBCUTANEOUS at 17:25

## 2018-01-09 NOTE — PRG
DATE OF SERVICE:  01/09/2018

 

SUBJECTIVE:  He had washout and removal of hardware by Dr. Hagan and having some moderate pain at t
he site.  Having some abdominal cramps and wants to have a bowel movement.  Has not had any voiding f
or the past 4 hours since surgery.

 

PHYSICAL EXAMINATION:

VITAL SIGNS:  T-max 98.4, blood pressure 130/70, pulse 82, respirations 16-20.

HEENT:  Ocular movements are conjugate.

LUNGS:  Symmetric air entry.  S1 and S2, regular rate.

ABDOMEN:  Soft, question of bladder distention.

EXTREMITIES:  Right lower extremity with splint.

 

LABORATORY DATA:  White cell count 9.0, hemoglobin 8.9, platelets 498 and creatinine 2.40.  We have 2
 or 3 wound samples from the leg pending.  The Gram stain of all 3 did not show any organisms, but th
e cultures are obviously pending.

 

ASSESSMENT AND DISCUSSION:  Type 2 diabetes, renal insufficiency stage 4, hypertension, coronary valdemar
ry disease, and fractured right tibia and knee, now with inflammatory process.  Removal of hardware a
nd protracted treatment with antimicrobial therapy now with recrudescence of inflammatory process fol
lowing November procedure and with evidence of osteomyelitis.  The patient had more hardware removed 
and I&D and cultures are pending and we will see what the results are and go from there.  Most likely
, we will need to go back on protracted treatment again for at least another 6-8 weeks.

## 2018-01-09 NOTE — PDOC.PN
- Subjective


Encounter Start Date: 01/09/18


Encounter Start Time: 08:20





Patient seen and examined. No new complaints. No overnight events





- Objective


Resuscitation Status: 


 











Resuscitation Status           FULL:Full Resuscitation














MAR Reviewed: Yes


Vital Signs & Weight: 


 Vital Signs (12 hours)











  Temp Pulse Resp BP Pulse Ox


 


 01/09/18 08:58   82   


 


 01/09/18 07:45  97.8 F  79  16   99


 


 01/09/18 07:40  97.8 F  79  16  138/64  99


 


 01/09/18 03:43  98.4 F  82  16   99








 Weight











Admit Weight                   264 lb 8.864 oz


 


Weight                         264 lb














I&O: 


 











 01/08/18 01/09/18 01/10/18





 06:59 06:59 06:59


 


Intake Total 1600  


 


Output Total 2000 700 


 


Balance -400 -700 











Result Diagrams: 


 01/09/18 06:31





 01/09/18 06:31


Additional Labs: 


 Accuchecks











  01/09/18 01/08/18 01/08/18





  05:21 19:32 16:11


 


POC Glucose  287 H  503 H  375 H














  01/08/18





  12:07


 


POC Glucose  300 H














Phys Exam





- Physical Examination


Constitutional: NAD


HEENT: PERRLA, moist MMs, sclera anicteric


Neck: no JVD, supple


Respiratory: no wheezing, no rales, no rhonchi


Cardiovascular: RRR, no significant murmur, no rub


Gastrointestinal: soft, non-tender, no distention, positive bowel sounds


Musculoskeletal: no edema, pulses present


right leg cellulitis improving


Neurological: non-focal, normal sensation, moves all 4 limbs


Psychiatric: normal affect, A&O x 3


Skin: no rash, normal turgor





Dx/Plan


(1) Cellulitis of right lower extremity


Code(s): L03.115 - CELLULITIS OF RIGHT LOWER LIMB   Status: Acute   





(2) Anemia of renal disease


Code(s): D63.1 - ANEMIA IN CHRONIC KIDNEY DISEASE   Status: Chronic   





(3) CAD (coronary artery disease)


Code(s): I25.10 - ATHSCL HEART DISEASE OF NATIVE CORONARY ARTERY W/O ANG PCTRS 

  Status: Chronic   


Qualifiers: 


 





(4) DM2 (diabetes mellitus, type 2)


Status: Chronic   


Qualifiers: 


 





(5) HTN (hypertension)


Code(s): I10 - ESSENTIAL (PRIMARY) HYPERTENSION   Status: Chronic   





(6) Neuropathy


Code(s): G62.9 - POLYNEUROPATHY, UNSPECIFIED   Status: Chronic   





(7) Obesity (BMI 30-39.9)


Code(s): E66.9 - OBESITY, UNSPECIFIED   Status: Chronic   





(8) Hypoglycemia associated with type 2 diabetes mellitus


Code(s): E11.649 - TYPE 2 DIABETES MELLITUS WITH HYPOGLYCEMIA WITHOUT COMA   

Status: Acute   





(9) Osteomyelitis of right tibia


Code(s): M86.9 - OSTEOMYELITIS, UNSPECIFIED   Status: Acute   





- Plan


cont current plan of care, plan discussed w/ family





* his blood sugar is very labile, will monitor today and if high accuchek will 

increase long acting insulin


* today plan for hardware removal


* medication reviewed as below


* symptomatic treatment


* continue iv antibiotics as per dr squires


* pain controlled


* post op care as per ortho.








Review of Systems





- Review of Systems


Eyes: negative: Pain, Vision Change, Conjunctivae Inflammation, Eyelid 

Inflammation, Redness, Other


ENT: negative: Ear Pain, Ear Discharge, Nose Pain, Nose Discharge, Nose 

Congestion, Mouth Pain, Mouth Swelling, Throat Pain, Throat Swelling, Other


Respiratory: negative: Cough, Dry, Shortness of Breath, Hemoptysis, SOB with 

Excertion, Pleuritic Pain, Sputum, Wheezing


Cardiovascular: negative: chest pain, palpitations, orthopnea, paroxysmal 

nocturnal dyspnea, edema, light headedness, other


Gastrointestinal: negative: Nausea, Vomiting, Abdominal Pain, Diarrhea, 

Constipation, Melena, Hematochezia, Other


Genitourinary: negative: Dysuria, Frequency, Incontinence, Hematuria, Retention

, Other


Musculoskeletal: negative: Neck Pain, Shoulder Pain, Arm Pain, Back Pain, Hand 

Pain, Leg Pain, Foot Pain, Other


Skin: negative: Rash, Lesions, Per, Bruising, Other





- Medications/Allergies


Allergies/Adverse Reactions: 


 Allergies











Allergy/AdvReac Type Severity Reaction Status Date / Time


 


Sulfa (Sulfonamide Allergy   Verified 11/02/17 15:34





Antibiotics)     











Medications: 


 Current Medications





Acetaminophen (Tylenol)  650 mg PO Q4H PRN


   PRN Reason: Headache/Fever or Pain


   Last Admin: 01/04/18 09:43 Dose:  650 mg


Hydrocodone Bitart/Acetaminophen (Norco 5/325)  1 tab PO Q4H PRN


   PRN Reason: Moderate Pain (4-6)


   Last Admin: 01/09/18 00:06 Dose:  1 tab


Al Hydroxide/Mg Hydroxide (Maalox)  15 ml PO Q4H PRN


   PRN Reason: Heartburn  or Indigestion


Alprazolam (Xanax)  0.25 mg PO TID PRN


   PRN Reason: Anxiety


   Last Admin: 01/09/18 00:49 Dose:  0.25 mg


Artificial Tears (Tears Renewed 15ml Bottle)  0 drop EA EYE PRN PRN


   PRN Reason: Dry Eyes


Aspirin (Ecotrin)  81 mg PO DAILY [facility]


   Last Admin: 01/08/18 09:27 Dose:  81 mg


Bumetanide (Bumex)  0.5 mg PO DAILY [facility]


   Last Admin: 01/08/18 09:52 Dose:  0.5 mg


Calcium Acetate (Phoslo)  1,334 mg PO TID-[facility]


   Last Admin: 01/09/18 08:57 Dose:  Not Given


Dextrose/Water (Dextrose 50%)  25 gm SLOW IVP PRN PRN


   PRN Reason: Hypoglycemia


Docusate Sodium (Colace)  100 mg PO BID [facility]


   Last Admin: 01/09/18 08:58 Dose:  Not Given


Duloxetine HCl (Cymbalta)  60 mg PO DAILY [facility]


   Last Admin: 01/08/18 09:28 Dose:  60 mg


Famotidine (Pepcid)  20 mg PO DAILY [facility]


   Last Admin: 01/08/18 09:28 Dose:  20 mg


Fentanyl (Duragesic)  25 mcg TD Q3D [facility]


   Last Admin: 01/08/18 15:43 Dose:  25 mcg


Glucagon (Glucagon)  1 mg IM PRN PRN


   PRN Reason: Hypoglycemia


Guaifenesin (Robitussin Sf)  200 mg PO TIDPRN PRN


   PRN Reason: Cough


   Last Admin: 01/04/18 22:53 Dose:  200 mg


Heparin Sodium (Porcine) (Heparin)  5,000 units SC TID [facility]


   Last Admin: 01/09/18 08:58 Dose:  Not Given


Hydralazine HCl (Apresoline)  10 mg SLOW IVP Q4H PRN


   PRN Reason: Systolic BP > 180


Hydralazine HCl (Apresoline)  25 mg PO QID [facility]


   Last Admin: 01/09/18 08:58 Dose:  Not Given


Dextrose/Water (D5w)  1,000 mls @ 0 mls/hr IV .Q0M PRN; As Directed


   PRN Reason: Hypoglycemia


Cefepime HCl 1 gm/Miscellaneous Medication 1 each/ Sterile Water  10 mls @ 120 

mls/hr SLOW IVP 0600 [facility]


   Last Admin: 01/09/18 06:10 Dose:  10 mls


Insulin Detemir 25 units/ (Miscellaneous Medication)  0.25 mls @ 0 mls/hr SC 

QAM [facility]


   Last Admin: 01/08/18 09:28 Dose:  0.25 mls


Insulin Detemir 25 units/ (Miscellaneous Medication)  0.25 mls @ 0 mls/hr SC HS 

[facility]


   Last Admin: 01/08/18 20:32 Dose:  0.25 mls


Vancomycin HCl 1 gm/ Device  200 mls @ 200 mls/hr IVPB 2100 [facility]


Insulin Human Lispro (Humalog)  0 units SC .BEDTIME SLIDING SC PRN


   PRN Reason: Bedtime Correctional Scale


   Last Admin: 01/08/18 20:32 Dose:  5 unit


Insulin Human Lispro (Humalog)  0 units SC .AGGRESSIVE SLIDING PRN; Protocol


   PRN Reason: AGGRESSIVE SLIDING SCALE


   Last Admin: 01/08/18 18:05 Dose:  13 unit


Levothyroxine Sodium (Synthroid)  25 mcg PO 0600 [facility]


   Last Admin: 01/09/18 06:11 Dose:  25 mcg


Loratadine (Claritin)  10 mg PO DAILYPRN PRN


   PRN Reason: Sinus Symptoms


Magnesium Hydroxide (Milk Of Magnesium)  30 ml PO DAILYPRN PRN


   PRN Reason: Constipation


   Last Admin: 01/04/18 19:59 Dose:  30 ml


Mineral Oil/White Petrolatum (Eucerin Cream)  0 gm TOP BIDPRN PRN


   PRN Reason: Dry Skin


Miscellaneous Medication (Pharmacy To Dose)  1 each IVPB PRN PRN


   PRN Reason: Pharmacy to dose


Nystatin (Mycostatin Ointment)  0 gm TOP DAILY [facility]


   Last Admin: 01/08/18 09:28 Dose:  1 applic


Ondansetron HCl (Zofran Odt)  4 mg PO Q6H PRN


   PRN Reason: Nausea/Vomiting


Ondansetron HCl (Zofran)  4 mg IVP Q6H PRN


   PRN Reason: Nausea/Vomiting


Phenol (Chloraseptic Spray 180 Ml Bot)  0 ml PO PRN PRN


   PRN Reason: Sore Throat


Polyethylene Glycol (Miralax)  17 gm PO DAILY [facility]


   Last Admin: 01/08/18 09:28 Dose:  17 gm


Rosuvastatin Calcium (Crestor)  20 mg PO QAM [facility]


   Last Admin: 01/08/18 09:27 Dose:  20 mg


Sodium Chloride (Flush - Normal Saline)  10 ml IVF Q12HR [facility]


   Last Admin: 01/09/18 08:58 Dose:  Not Given


Sodium Chloride (Flush - Normal Saline)  10 ml IVF PRN PRN


   PRN Reason: Saline Flush


   Last Admin: 01/09/18 06:11 Dose:  10 ml


Sodium Chloride (Ocean Nasal Spray 0.65%)  0 ml EA NARE QIDPRN PRN


   PRN Reason: Nasal Congestion


Tamsulosin HCl (Flomax)  0.4 mg PO HS [facility]


   Last Admin: 01/08/18 20:31 Dose:  0.4 mg

## 2018-01-10 LAB
HGB BLD-MCNC: 7.9 G/DL (ref 14–18)
MCH RBC QN AUTO: 28.3 PG (ref 27–31)
MCV RBC AUTO: 89.3 FL (ref 80–94)
PLATELET # BLD AUTO: 459 THOU/UL (ref 130–400)
RBC # BLD AUTO: 2.79 MILL/UL (ref 4.7–6.1)
WBC # BLD AUTO: 13.9 THOU/UL (ref 4.8–10.8)

## 2018-01-10 RX ADMIN — HEPARIN SODIUM SCH UNITS: 5000 INJECTION, SOLUTION INTRAVENOUS; SUBCUTANEOUS at 14:52

## 2018-01-10 RX ADMIN — ASPIRIN SCH MG: 81 TABLET ORAL at 09:04

## 2018-01-10 RX ADMIN — HEPARIN SODIUM SCH UNITS: 5000 INJECTION, SOLUTION INTRAVENOUS; SUBCUTANEOUS at 21:15

## 2018-01-10 RX ADMIN — VANCOMYCIN HYDROCHLORIDE SCH MLS: 750 INJECTION, POWDER, LYOPHILIZED, FOR SOLUTION INTRAVENOUS at 14:51

## 2018-01-10 RX ADMIN — INSULIN LISPRO PRN UNIT: 100 INJECTION, SOLUTION INTRAVENOUS; SUBCUTANEOUS at 06:56

## 2018-01-10 RX ADMIN — HYDROCODONE BITARTRATE AND ACETAMINOPHEN PRN TAB: 5; 325 TABLET ORAL at 04:26

## 2018-01-10 RX ADMIN — CEFEPIME HYDROCHLORIDE SCH MLS: 1 INJECTION, POWDER, FOR SOLUTION INTRAMUSCULAR; INTRAVENOUS at 07:03

## 2018-01-10 RX ADMIN — HEPARIN SODIUM SCH UNITS: 5000 INJECTION, SOLUTION INTRAVENOUS; SUBCUTANEOUS at 09:06

## 2018-01-10 RX ADMIN — HYDROCODONE BITARTRATE AND ACETAMINOPHEN PRN TAB: 5; 325 TABLET ORAL at 00:20

## 2018-01-10 RX ADMIN — HYDROCODONE BITARTRATE AND ACETAMINOPHEN PRN TAB: 5; 325 TABLET ORAL at 21:10

## 2018-01-10 RX ADMIN — INSULIN LISPRO PRN UNIT: 100 INJECTION, SOLUTION INTRAVENOUS; SUBCUTANEOUS at 16:07

## 2018-01-10 RX ADMIN — INSULIN LISPRO PRN UNIT: 100 INJECTION, SOLUTION INTRAVENOUS; SUBCUTANEOUS at 21:14

## 2018-01-10 RX ADMIN — INSULIN LISPRO PRN UNIT: 100 INJECTION, SOLUTION INTRAVENOUS; SUBCUTANEOUS at 13:28

## 2018-01-10 RX ADMIN — Medication SCH ML: at 21:17

## 2018-01-10 RX ADMIN — Medication SCH ML: at 10:06

## 2018-01-10 RX ADMIN — HYDROCODONE BITARTRATE AND ACETAMINOPHEN PRN TAB: 5; 325 TABLET ORAL at 13:31

## 2018-01-10 NOTE — PDOC.PN
- Subjective


Encounter Start Date: 01/10/18


Encounter Start Time: 15:00





Patient seen and examined. No new complaints. No fever. Pain controlled. No 

overnight events





- Objective


Resuscitation Status: 


 











Resuscitation Status           FULL:Full Resuscitation














MAR Reviewed: Yes


Vital Signs & Weight: 


 Vital Signs (12 hours)











  Temp Pulse Pulse Pulse Pulse Resp BP


 


 01/10/18 16:22  97.7 F  79     16 


 


 01/10/18 16:12   79      116/71


 


 01/10/18 13:42    82  93  93  


 


 01/10/18 13:07  98.0 F  79     18 


 


 01/10/18 12:18   94     


 


 01/10/18 09:07  97.5 F L  94     18 


 


 01/10/18 09:06   83      152/73 H


 


 01/10/18 07:45  98 F  83     18 














  BP BP BP BP Pulse Ox Pulse Ox Pulse Ox


 


 01/10/18 16:22     116/71  100  


 


 01/10/18 16:12       


 


 01/10/18 13:42  126/53 L  130/66  144/69 H    99  99


 


 01/10/18 13:07     124/62  100  


 


 01/10/18 12:18       


 


 01/10/18 09:07     152/73 H  100  


 


 01/10/18 09:06       


 


 01/10/18 07:45      100  














  Pulse Ox


 


 01/10/18 16:22 


 


 01/10/18 16:12 


 


 01/10/18 13:42  100


 


 01/10/18 13:07 


 


 01/10/18 12:18 


 


 01/10/18 09:07 


 


 01/10/18 09:06 


 


 01/10/18 07:45 








 Weight











Admit Weight                   264 lb 8.864 oz


 


Weight                         264 lb














I&O: 


 











 01/09/18 01/10/18 01/11/18





 06:59 06:59 06:59


 


Intake Total  1320 1840


 


Output Total 700 1255 500


 


Balance -579 45 1948











Result Diagrams: 


 01/10/18 05:30





 01/09/18 06:31


Additional Labs: 


 Accuchecks











  01/10/18 01/10/18 01/10/18





  15:37 10:34 05:34


 


POC Glucose  469 H  448 H  470 H














  01/09/18 01/07/18





  20:46 16:31


 


POC Glucose  469 H  380 H














Phys Exam





- Physical Examination


Constitutional: NAD


Respiratory: no wheezing, no rhonchi


Cardiovascular: RRR, no rub


Gastrointestinal: soft, non-tender, positive bowel sounds


Neurological: moves all 4 limbs





Dx/Plan





- Plan


DVT proph w/heparin, DVT proph w/SCDs





IMPRESSION:


1. RLE Cellulitis with Osteomyelitis s/p hardware removal 1/9


2. DM1-labile


3. CKD 4


4. CAD


5. Obesity BMI 37/Anemia due to renal disease/Diabetic neuropathy





PLAN:


* PICC line per ID


* Atbx per ID


* DC planning


* Cont to monitor


* Cont current meds as below


* AM labs


* Await sensitivities on cultures below


* Increase Levemir to 30 QAM and cont 35 HS





Microbiology





01/09/18 10:54   Leg - Tissue   Bacterial Culture - Preliminary


01/09/18 10:54   Leg - Tissue     Staphylococcus aureus


01/09/18 10:52   Leg - Tissue   Bacterial Culture - Preliminary


01/09/18 10:52   Leg - Tissue     Staphylococcus aureus


01/09/18 10:37   Leg - Wound   Bacterial Culture - Preliminary











Review of Systems





- Review of Systems


Respiratory: negative: Cough, Dry, Shortness of Breath, Hemoptysis, SOB with 

Excertion, Pleuritic Pain, Sputum, Wheezing


Cardiovascular: negative: chest pain, palpitations, orthopnea, paroxysmal 

nocturnal dyspnea, edema, light headedness


Gastrointestinal: negative: Nausea, Vomiting, Abdominal Pain, Diarrhea, 

Constipation, Melena, Hematochezia





- Medications/Allergies


Allergies/Adverse Reactions: 


 Allergies











Allergy/AdvReac Type Severity Reaction Status Date / Time


 


Sulfa (Sulfonamide Allergy   Verified 11/02/17 15:34





Antibiotics)     











Medications: 


 Current Medications





Acetaminophen (Tylenol)  650 mg PO Q4H PRN


   PRN Reason: Headache/Fever or Pain


   Last Admin: 01/04/18 09:43 Dose:  650 mg


Hydrocodone Bitart/Acetaminophen (Norco 5/325)  1 tab PO Q4H PRN


   PRN Reason: Moderate Pain (4-6)


   Last Admin: 01/09/18 00:06 Dose:  1 tab


Hydrocodone Bitart/Acetaminophen (Norco 5/325)  2 tab PO Q4H PRN


   PRN Reason: Severe Pain (7-10)


   Last Admin: 01/10/18 13:31 Dose:  2 tab


Al Hydroxide/Mg Hydroxide (Maalox)  15 ml PO Q4H PRN


   PRN Reason: Heartburn  or Indigestion


Alprazolam (Xanax)  0.25 mg PO TID PRN


   PRN Reason: Anxiety


   Last Admin: 01/10/18 13:32 Dose:  0.25 mg


Artificial Tears (Tears Renewed 15ml Bottle)  0 drop EA EYE PRN PRN


   PRN Reason: Dry Eyes


Aspirin (Ecotrin)  81 mg PO DAILY [facility]


   Last Admin: 01/10/18 09:04 Dose:  81 mg


Bumetanide (Bumex)  0.5 mg PO DAILY [facility]


   Last Admin: 01/10/18 09:04 Dose:  0.5 mg


Calcium Acetate (Phoslo)  1,334 mg PO TID-[facility]


   Last Admin: 01/10/18 16:11 Dose:  1,334 mg


Dextrose/Water (Dextrose 50%)  25 gm SLOW IVP PRN PRN


   PRN Reason: Hypoglycemia


Docusate Sodium (Colace)  100 mg PO BID [facility]


   Last Admin: 01/10/18 09:05 Dose:  100 mg


Duloxetine HCl (Cymbalta)  60 mg PO DAILY [facility]


   Last Admin: 01/10/18 09:05 Dose:  60 mg


Famotidine (Pepcid)  20 mg PO DAILY [facility]


   Last Admin: 01/10/18 09:06 Dose:  20 mg


Fentanyl (Duragesic)  25 mcg TD Q3D [facility]


   Last Admin: 01/08/18 15:43 Dose:  25 mcg


Glucagon (Glucagon)  1 mg IM PRN PRN


   PRN Reason: Hypoglycemia


Guaifenesin (Robitussin Sf)  200 mg PO TIDPRN PRN


   PRN Reason: Cough


   Last Admin: 01/04/18 22:53 Dose:  200 mg


Heparin Sodium (Porcine) (Heparin)  5,000 units SC TID [facility]


   Last Admin: 01/10/18 14:52 Dose:  5,000 units


Hydralazine HCl (Apresoline)  10 mg SLOW IVP Q4H PRN


   PRN Reason: Systolic BP > 180


Hydralazine HCl (Apresoline)  25 mg PO QID [facility]


   Last Admin: 01/10/18 16:12 Dose:  Not Given


Dextrose/Water (D5w)  1,000 mls @ 0 mls/hr IV .Q0M PRN; As Directed


   PRN Reason: Hypoglycemia


Cefepime HCl 1 gm/Miscellaneous Medication 1 each/ Sterile Water  10 mls @ 120 

mls/hr SLOW IVP 0600 [facility]


   Last Admin: 01/10/18 07:03 Dose:  10 mls


Insulin Detemir 35 units/ (Miscellaneous Medication)  0.35 mls @ 0 mls/hr SC HS 

[facility]


   Last Admin: 01/09/18 21:17 Dose:  0.35 mls


Vancomycin HCl 750 mg/ Sodium (Chloride)  250 mls @ 250 mls/hr IVPB 1500 [facility]


   Last Admin: 01/10/18 14:51 Dose:  250 mls


Insulin Detemir 30 units/ (Miscellaneous Medication)  0.3 mls @ 0 mls/hr SC QAM 

[facility]


Insulin Human Lispro (Humalog)  0 units SC .BEDTIME SLIDING SC PRN


   PRN Reason: Bedtime Correctional Scale


   Last Admin: 01/09/18 22:41 Dose:  5 unit


Insulin Human Lispro (Humalog)  0 units SC .AGGRESSIVE SLIDING PRN; Protocol


   PRN Reason: AGGRESSIVE SLIDING SCALE


   Last Admin: 01/10/18 16:07 Dose:  13 unit


Levothyroxine Sodium (Synthroid)  25 mcg PO 0600 [facility]


   Last Admin: 01/10/18 06:55 Dose:  25 mcg


Loratadine (Claritin)  10 mg PO DAILYPRN PRN


   PRN Reason: Sinus Symptoms


Magnesium Hydroxide (Milk Of Magnesium)  30 ml PO DAILYPRN PRN


   PRN Reason: Constipation


   Last Admin: 01/04/18 19:59 Dose:  30 ml


Mineral Oil/White Petrolatum (Eucerin Cream)  0 gm TOP BIDPRN PRN


   PRN Reason: Dry Skin


Miscellaneous Medication (Pharmacy To Dose)  1 each IVPB PRN PRN


   PRN Reason: Pharmacy to dose


Nystatin (Mycostatin Ointment)  0 gm TOP DAILY [facility]


   Last Admin: 01/10/18 09:07 Dose:  1 applic


Ondansetron HCl (Zofran Odt)  4 mg PO Q6H PRN


   PRN Reason: Nausea/Vomiting


Ondansetron HCl (Zofran)  4 mg IVP Q6H PRN


   PRN Reason: Nausea/Vomiting


Phenol (Chloraseptic Spray 180 Ml Bot)  0 ml PO PRN PRN


   PRN Reason: Sore Throat


Polyethylene Glycol (Miralax)  17 gm PO DAILY [facility]


   Last Admin: 01/10/18 09:03 Dose:  17 gm


Rosuvastatin Calcium (Crestor)  20 mg PO QAM [facility]


   Last Admin: 01/10/18 09:07 Dose:  20 mg


Sodium Chloride (Flush - Normal Saline)  10 ml IVF Q12HR [facility]


   Last Admin: 01/10/18 10:06 Dose:  10 ml


Sodium Chloride (Flush - Normal Saline)  10 ml IVF PRN PRN


   PRN Reason: Saline Flush


   Last Admin: 01/09/18 06:11 Dose:  10 ml


Sodium Chloride (Ocean Nasal Spray 0.65%)  0 ml EA NARE QIDPRN PRN


   PRN Reason: Nasal Congestion


Tamsulosin HCl (Flomax)  0.4 mg PO HS [facility]


   Last Admin: 01/09/18 21:20 Dose:  0.4 mg

## 2018-01-10 NOTE — RAD
INTRAOPERATIVE RADIOGRAPH RIGHT TIBIA AND FIBULA:

 

Date:  01/09/18

 

HISTORY:  

Hardware removal. 

 

FINDINGS:

Three intraoperative images are provided. Osseous detail is limited on intraoperative imaging. Metall
ic gordon traverses the proximal tibial shaft, incompletely imaged proximally and distally. Poorly asses
sed fracture with cortical and medullary irregularity noted within proximal tibia. 

 

IMPRESSION: 

Limited intraoperative imaging as detailed above. 

 

 

POS: [facility]

## 2018-01-10 NOTE — PRG
DATE OF SERVICE:  01/10/2017 

 

DICTATION TIME:  0742

 

HISTORY OF PRESENT ILLNESS:  Mr. Dexter is a 67-year-old male with history of right tibia nail who 
underwent  a hardware removal with I&D, cultures and biopsy of soft tissue with the tibia with placem
ent of antibiotic spacer nail yesterday.  The patient is comfortably resting in bed.  Currently he ha
s a knee immobilizer, no pain.  The patient is back to his previous level of control. 

 

PHYSICAL EXAMINATION: 

VITAL SIGNS:  Afebrile, blood pressure elevated, vital signs are otherwise stable.

GENERAL:  Alert and oriented male in no acute distress.

EXTREMITIES:  Extremity knee immobilizer in place, no strike through.  The patient has unchanged exam
 with very limited flexion plantar flexion of his right foot.  The patient's sensation is diminished.
  He has got sluggish refill.

 

IMPRESSION:  Right proximal tibia fracture, acute on chronic infection, possible osteomyelitis.

 

ASSESSMENT AND PLAN:  The patient had 3 cultures sent from deep tissues within the patient's proximal
 tibia, which are pending pathology.  The patient likely needs 6-8 weeks of IV antibiotics.  I feel t
hat he likely would require at least 1 year of antibiotic suppression.  The patient's antibiotic nail
 will be required to be removed in 2 weeks.  At this point, once he has his PICC line and IV Meloxica
m he can be transferred to an outlying facility for bed space.  He can return for removal of the nail
 in 2-3 weeks.  The patient understands the plan of care and we will continue from here.

## 2018-01-10 NOTE — OP
DATE OF SURGERY:  01/09/2018

 

ADMITTING DIAGNOSES:  Right lower extremity cellulitis with possible 
osteomyelitis, history of right tibia fracture united, previous incision and 
drainage of wound and Integra grafting.

 

POSTOPERATIVE DIAGNOSES:  Right tibia nail stable and healed proximal tibia 
with possible osteomyelitis.

 

PROCEDURES PERFORMED:

1.  Removal of hardware deep, three locking screws and nail.

2.  Bone biopsy with irrigation and debridement of osteomylitis of the 
intramedullary canal.

3.  Placement of intramedullary nailing antibiotic spacer.

 

STAFF:  Jordan Hagan M.D.

 

ASSISTANT:  None.

 

ANESTHESIA:  Sixto Palacio M.D.  The patient received general endotracheal 
intubation.

 

ESTIMATED BLOOD LOSS:  100 mL.

 

TOURNIQUET TIME:  None.

 

IMPLANTS:  A ball-tip guidewire with an antibiotic nail made with 4 grams of 
vancomycin and 3.6 grams of tobramycin.

 

EXPLANTS:  x3, 5-0 locking screws and a size 12 tibial nail.

 

CULTURES:  x3

1.  Right anterior lateral screw hole soft tissue.

2.  Intramedullary canal proximal  metaphysis.

3.  Tissue within cannulated screws of nails sent for culture and path.

 

ANTIBIOTICS:  Scheduled cefepime, Ancef preoperatively.

 

COMPLICATIONS:  None.

 

HISTORY OF PRESENT ILLNESS:  Mr. Dexter is a 67-year-old male who is well 
known to my service.  The patient initially broke his leg back in 06/2017.  The 
patient underwent urgent nailing of his right tibia.  The patient then had a 
repeat I and D of his leg, was on IV antibiotics for suppression to allow the 
tibia to heal.  The patient continued to have wound VAC and could not continue 
to heal.  The patient was then switched and sent for Integra grafting by Dr. Kim.  That has healed.  The patient actually was pain free and was doing 
pretty well up until about a week ago when his right knee flared up.  The 
patient had elevated CRP, bone scan concerning for healing fracture versus 
osteomyelitis.  I was concerned given the patient's history of severe diabetes 
and has been trying to get the patient to heal through the site for hardware 
removal and planned for explant of the tissue.  The patient was admitted with a 
cellulitis and given this, I elected to discuss the patient's removal of 
hardware nail for I and D, possible antibiotic spacer.  I discussed with 
patient the bone biopsy for osteomyelitis.  I discussed with the patient the 
risks and benefits of surgery to include pain, scar, bleeding, infection, 
damage to vital structures, decreased range of motion, strength, need for 
further surgeries, failure of procedure, damage to skin needing further soft 
tissue coverage, loss of life or limb.  The patient understood the risks and 
benefits and elected to proceed.

 

PROCEDURE IN DETAIL:  Time out was performed designating the patient's right 
lower extremity as the operative site based on sight, consents, and markings.  
After completion of timeout, the patient's right lower extremity was prepped 
and draped in a sterile fashion using chlorhexidine and Betadine over some of 
the small excoriated areas.   We allowed that to dry and then started our 
procedure, moved distally, made a skin incision, got into probably the patient'
s saphenous vessels, cauterized them and came down on the screw, removed it, 
cleaned out the site distally.  I then moved proximally.  Anterior midline 
incision through what would appeared the old scar incision, came down with 
probably portion of the Integra graft and down to the skin, came down through 
Integra graft to find the patient's tendon and moved proximally to help me 
define a plane and created skin flaps medially and laterally.  I then moved 
fluoroscopic guidance, found and visualized my screw holes.  I made a little 
bit longer a stab incision about 2 cm incision that was difficult to perfectly 
find the previous scar incision.  I made my incision, dissected down, found my 
screws, removed my anterior medial and anterior lateral oblique screws, my 
anterolateral I took a soft tissue biopsy from.  I then moved back to my knee 
and split my tendon and taken some of the tendon down to expose, placed the 
guidepin into place to bony curet into place.  I curetted the patient's bone 
into place.  I curetted to expose and took out the patient's screw proximally 
to help position my screw, passed it and removed the nail.  I then curetted the 
bone out of the patient's proximal tibia.  Under fluoroscopic AP and lateral 
radiographs did not show any gross movement of the nail of the tibia.  Given 
this and the previous x-rays showing bone healing, I placed my Pulsavac canal 
instrument and positioned it in the patient's canal.  I washed about 2 liters 
through the canal.  At completion of this, I then moved back to the patient.  I 
allowed it to escape distally through the distal incision and sucking it all 
out.  I had taken two other previous specimens, one from the proximal tibia and 
one from the nail.  After this, I started reaming, placed my ball-tip guidewire 
and reamed up to size 14.  I then took a 32 [place] chest tube with mineral oil 
filled inside, cut a ball-tip guidewire to length and placed cement with about 
3.6 grams of tobramycin and 4 grams of vancomycin with cement and made a nice 
smooth nail, cut it off, placed it down the canal with a bit proximal  and a 
little hubcap over the top to keep it from sliding distally.  I then washed the 
rest of the joint, I did not penetrate into the joint.  I then washed, closed 
the tendon with #0 Prolene for the skin.  I then washed, closed with prolene .  
I will see the patient back in 2 weeks have to come back to have this removed.  
Antibiotics for suppression for life.

 

MTDD

## 2018-01-11 LAB
ANION GAP SERPL CALC-SCNC: 11 MMOL/L (ref 10–20)
BASOPHILS # BLD AUTO: 0 THOU/UL (ref 0–0.2)
BASOPHILS NFR BLD AUTO: 0.4 % (ref 0–1)
BUN SERPL-MCNC: 50 MG/DL (ref 8.4–25.7)
CALCIUM SERPL-MCNC: 8.4 MG/DL (ref 7.8–10.44)
CHLORIDE SERPL-SCNC: 97 MMOL/L (ref 98–107)
CO2 SERPL-SCNC: 27 MMOL/L (ref 23–31)
CREAT CL PREDICTED SERPL C-G-VRATE: 48 ML/MIN (ref 70–130)
EOSINOPHIL # BLD AUTO: 0.1 THOU/UL (ref 0–0.7)
EOSINOPHIL NFR BLD AUTO: 1.5 % (ref 0–10)
GLUCOSE SERPL-MCNC: 431 MG/DL (ref 80–115)
HGB BLD-MCNC: 7.2 G/DL (ref 14–18)
LYMPHOCYTES # BLD: 1.8 THOU/UL (ref 1.2–3.4)
LYMPHOCYTES NFR BLD AUTO: 23.3 % (ref 21–51)
MAGNESIUM SERPL-MCNC: 1.8 MG/DL (ref 1.6–2.6)
MCH RBC QN AUTO: 28.2 PG (ref 27–31)
MCV RBC AUTO: 89.2 FL (ref 80–94)
MONOCYTES # BLD AUTO: 0.4 THOU/UL (ref 0.11–0.59)
MONOCYTES NFR BLD AUTO: 5.5 % (ref 0–10)
NEUTROPHILS # BLD AUTO: 5.2 THOU/UL (ref 1.4–6.5)
NEUTROPHILS NFR BLD AUTO: 69.4 % (ref 42–75)
PLATELET # BLD AUTO: 364 THOU/UL (ref 130–400)
POTASSIUM SERPL-SCNC: 4.7 MMOL/L (ref 3.5–5.1)
RBC # BLD AUTO: 2.55 MILL/UL (ref 4.7–6.1)
SODIUM SERPL-SCNC: 130 MMOL/L (ref 136–145)
VANCOMYCIN SERPL-MCNC: 18.6 UG/ML
WBC # BLD AUTO: 7.6 THOU/UL (ref 4.8–10.8)

## 2018-01-11 PROCEDURE — 02HV33Z INSERTION OF INFUSION DEVICE INTO SUPERIOR VENA CAVA, PERCUTANEOUS APPROACH: ICD-10-PCS | Performed by: RADIOLOGY

## 2018-01-11 RX ADMIN — DOCUSATE SODIUM 50 MG AND SENNOSIDES 8.6 MG SCH TAB: 8.6; 5 TABLET, FILM COATED ORAL at 21:11

## 2018-01-11 RX ADMIN — INSULIN LISPRO PRN UNIT: 100 INJECTION, SOLUTION INTRAVENOUS; SUBCUTANEOUS at 19:36

## 2018-01-11 RX ADMIN — HEPARIN SODIUM SCH UNITS: 5000 INJECTION, SOLUTION INTRAVENOUS; SUBCUTANEOUS at 08:15

## 2018-01-11 RX ADMIN — INSULIN LISPRO SCH UNIT: 100 INJECTION, SOLUTION INTRAVENOUS; SUBCUTANEOUS at 16:47

## 2018-01-11 RX ADMIN — HYDROCODONE BITARTRATE AND ACETAMINOPHEN PRN TAB: 5; 325 TABLET ORAL at 03:56

## 2018-01-11 RX ADMIN — ASPIRIN SCH MG: 81 TABLET ORAL at 08:16

## 2018-01-11 RX ADMIN — INSULIN LISPRO PRN UNIT: 100 INJECTION, SOLUTION INTRAVENOUS; SUBCUTANEOUS at 06:10

## 2018-01-11 RX ADMIN — Medication SCH ML: at 11:19

## 2018-01-11 RX ADMIN — HYDROCODONE BITARTRATE AND ACETAMINOPHEN PRN TAB: 5; 325 TABLET ORAL at 15:05

## 2018-01-11 RX ADMIN — INSULIN LISPRO PRN UNIT: 100 INJECTION, SOLUTION INTRAVENOUS; SUBCUTANEOUS at 21:13

## 2018-01-11 RX ADMIN — Medication SCH ML: at 21:23

## 2018-01-11 RX ADMIN — INSULIN LISPRO SCH UNIT: 100 INJECTION, SOLUTION INTRAVENOUS; SUBCUTANEOUS at 13:48

## 2018-01-11 RX ADMIN — HEPARIN SODIUM SCH UNITS: 5000 INJECTION, SOLUTION INTRAVENOUS; SUBCUTANEOUS at 21:11

## 2018-01-11 RX ADMIN — INSULIN LISPRO PRN UNIT: 100 INJECTION, SOLUTION INTRAVENOUS; SUBCUTANEOUS at 11:04

## 2018-01-11 RX ADMIN — CEFEPIME HYDROCHLORIDE SCH MLS: 1 INJECTION, POWDER, FOR SOLUTION INTRAMUSCULAR; INTRAVENOUS at 06:10

## 2018-01-11 RX ADMIN — HYDROCODONE BITARTRATE AND ACETAMINOPHEN PRN TAB: 5; 325 TABLET ORAL at 10:57

## 2018-01-11 RX ADMIN — VANCOMYCIN HYDROCHLORIDE SCH MLS: 750 INJECTION, POWDER, LYOPHILIZED, FOR SOLUTION INTRAVENOUS at 15:01

## 2018-01-11 RX ADMIN — HEPARIN SODIUM SCH UNITS: 5000 INJECTION, SOLUTION INTRAVENOUS; SUBCUTANEOUS at 15:01

## 2018-01-11 NOTE — SPC
PICC PLACEMENT

ULTRASOUND GUIDED VENOUS ACCESS:

(Peripherally Inserted Central Catheter)

 

DATE:

1-11-18

 

HISTORY:

67-year-old male with osteomyelitis of the right proximal tibia, requiring long term IV antibiotics. 


 

TECHNIQUE:

Catheter caliber: 5 [place]

Catheter trim length: 49 cm

Catheter lumen number: Single

Catheter tip location: Superior vena cava/right atrial junction

 

Signed, informed consent was obtained.  A tourniquet was applied at the proximal aspect of the arm.  
The arm was prepared and draped in the usual sterile fashion.  A 25 gauge needle was used to apply bu
ffered lidocaine superficially.  The vein was punctured with a 21 gauge micropuncture needle under ul
trasound guidance.  A 0.018 inch guide wire was advanced through the micropuncture needle and into th
e vein.  Under fluoroscopic guidance, the guide wire was advanced to the right atrium.  The PICC (per
ipherally inserted central catheter) was flushed and trimmed to the appropriate length.  The skin pun
cture hole was widened with a blade.  The micropuncture needle was exchanged over the guide wire for 
a 5 [place] peel-away dilator sheath.  The dilator was exchanged over the guide wire for the PICC, whi
ch was then further advanced under fluoroscopy.  The sheath and guide wire were removed.  The PICC wa
s flushed again and secured in place at the arm.  The patient tolerated the procedure well.  There wa
s no complication.

 

IMPRESSION:

Successful placement of PICC (peripherally inserted central catheter)

 

lelo 

 

POS: [facility]

## 2018-01-11 NOTE — CON
DATE OF CONSULTATION:  01/11/2018

 

HISTORY OF PRESENT ILLNESS:  Mr. Conley is a 67-year-old male with a right tibia nail, now with a conf
irmed diagnosis of osteomyelitis with Staph.  Inside the shaft from cultures taken deep.  The patient
 has antibiotics nail in place.  Resting in bed, otherwise comfortable.  Using his knee immobilizer. 
 Dressing clean, dry, and intact.

 

PHYSICAL EXAMINATION:

VITAL SIGNS:  The patient is afebrile.  His vitals are stable.

GENERAL:  Alert and oriented male, no acute distress, resting comfortably in bed.

NEUROLOGIC:  His sensory exam is unchanged.

 

Cultures positive for MRSA and Staph aureus, these cultures are pending.

 

IMPRESSION:  Right tibia fracture with osteomyelitis, status post hardware exchange.

 

ASSESSMENT AND PLAN:  The patient will need removal antibiotic now in about 2 weeks as well as chroni
c antibiotic suppression.  The patient can be transferred to outlying facility.  Return to the hospit
al and given bed utilization as long as his IV antibiotics are in place.  The patient will keep knee 
bars in place for a week.  He may start bending his knee unless there is any breakdown of the wounds.
  We will take the sutures out in the OR and wound VAC or do what the wound as needed at that time.  
The patient will follow up and the patient will be admitted to Medicine Service.  He will be discharg
ed him when they have his disposition in place.  He will come back to hospital in 2 weeks to have the
 antibiotic nail removed.

## 2018-01-11 NOTE — PDOC.PN
- Subjective


Encounter Start Date: 01/11/18


Encounter Start Time: 19:30





Patient seen and examined. No new complaints. No overnight events. Pain 

controlled. 





- Objective


Resuscitation Status: 


 











Resuscitation Status           FULL:Full Resuscitation














MAR Reviewed: Yes


Vital Signs & Weight: 


 Vital Signs (12 hours)











  Temp Pulse Resp BP BP Pulse Ox


 


 01/11/18 21:12   79   108/53 L  


 


 01/11/18 21:09  98.0 F  79  18   125/66  100


 


 01/11/18 20:17   85   111/65  


 


 01/11/18 15:46  98.3 F  85  16   111/65  100


 


 01/11/18 13:45   80   111/56 L  


 


 01/11/18 12:03  98.0 F  80  16   111/56 L  96








 Weight











Admit Weight                   264 lb 8.864 oz


 


Weight                         264 lb














I&O: 


 











 01/10/18 01/11/18 01/12/18





 06:59 06:59 06:59


 


Intake Total 1320 2800 


 


Output Total 1255 1100 


 


Balance 65 1700 











Result Diagrams: 


 01/11/18 04:54





 01/11/18 04:54


Additional Labs: 


 Accuchecks











  01/11/18 01/11/18 01/11/18





  15:34 10:55 05:56


 


POC Glucose  391 H  374 H  408 H














Phys Exam





- Physical Examination


Constitutional: NAD


Respiratory: no wheezing, no rhonchi


Cardiovascular: RRR, no rub


Gastrointestinal: soft, non-tender, positive bowel sounds


Neurological: moves all 4 limbs





Dx/Plan





- Plan


continue antibiotics, PT/OT, DVT proph w/heparin, DVT proph w/SCDs





IMPRESSION:


1. RLE Cellulitis with Osteomyelitis s/p hardware removal 1/9


2. DM1-labile


3. CKD 4


4. CAD


5. Obesity BMI 37/Anemia due to renal disease/Diabetic neuropathy





PLAN:


* s/p PICC


* DC Cefepime


* CM consulted for Atbx setup


* Cont current meds as below


* AM labs


* Increase Levemir to 35 QAM and cont 35 HS


* Add insulin to meal coverage with aggressive scale


* Treat constipation








Review of Systems





- Review of Systems


Respiratory: negative: Cough, Dry, Shortness of Breath, Hemoptysis, SOB with 

Excertion, Pleuritic Pain, Sputum, Wheezing


Cardiovascular: negative: chest pain, palpitations, orthopnea, paroxysmal 

nocturnal dyspnea, edema, light headedness


Gastrointestinal: Constipation.  negative: Nausea, Vomiting, Abdominal Pain, 

Diarrhea, Melena, Hematochezia





- Medications/Allergies


Allergies/Adverse Reactions: 


 Allergies











Allergy/AdvReac Type Severity Reaction Status Date / Time


 


Sulfa (Sulfonamide Allergy   Verified 11/02/17 15:34





Antibiotics)     











Medications: 


 Current Medications





Acetaminophen (Tylenol)  650 mg PO Q4H PRN


   PRN Reason: Headache/Fever or Pain


   Last Admin: 01/04/18 09:43 Dose:  650 mg


Hydrocodone Bitart/Acetaminophen (Norco 5/325)  1 tab PO Q4H PRN


   PRN Reason: Moderate Pain (4-6)


   Last Admin: 01/09/18 00:06 Dose:  1 tab


Hydrocodone Bitart/Acetaminophen (Norco 5/325)  2 tab PO Q4H PRN


   PRN Reason: Severe Pain (7-10)


   Last Admin: 01/11/18 15:05 Dose:  2 tab


Al Hydroxide/Mg Hydroxide (Maalox)  15 ml PO Q4H PRN


   PRN Reason: Heartburn  or Indigestion


Alprazolam (Xanax)  0.25 mg PO TID PRN


   PRN Reason: Anxiety


   Last Admin: 01/11/18 16:46 Dose:  0.25 mg


Artificial Tears (Tears Renewed 15ml Bottle)  0 drop EA EYE PRN PRN


   PRN Reason: Dry Eyes


Aspirin (Ecotrin)  81 mg PO DAILY [facility]


   Last Admin: 01/11/18 08:16 Dose:  81 mg


Bumetanide (Bumex)  0.5 mg PO DAILY [facility]


   Last Admin: 01/11/18 11:00 Dose:  0.5 mg


Calcium Acetate (Phoslo)  1,334 mg PO TID-[facility]


   Last Admin: 01/11/18 16:45 Dose:  1,334 mg


Dextrose/Water (Dextrose 50%)  25 gm SLOW IVP PRN PRN


   PRN Reason: Hypoglycemia


Duloxetine HCl (Cymbalta)  60 mg PO DAILY [facility]


   Last Admin: 01/11/18 08:19 Dose:  60 mg


Famotidine (Pepcid)  20 mg PO DAILY [facility]


   Last Admin: 01/11/18 08:19 Dose:  20 mg


Fentanyl (Duragesic)  25 mcg TD Q3D [facility]


   Last Admin: 01/11/18 14:05 Dose:  25 mcg


Glucagon (Glucagon)  1 mg IM PRN PRN


   PRN Reason: Hypoglycemia


Guaifenesin (Robitussin Sf)  200 mg PO TIDPRN PRN


   PRN Reason: Cough


   Last Admin: 01/04/18 22:53 Dose:  200 mg


Heparin Sodium (Porcine) (Heparin)  5,000 units SC TID [facility]


   Last Admin: 01/11/18 21:11 Dose:  5,000 units


Hydralazine HCl (Apresoline)  10 mg SLOW IVP Q4H PRN


   PRN Reason: Systolic BP > 180


Hydralazine HCl (Apresoline)  25 mg PO QID [facility]


   Last Admin: 01/11/18 21:12 Dose:  Not Given


Dextrose/Water (D5w)  1,000 mls @ 0 mls/hr IV .Q0M PRN; As Directed


   PRN Reason: Hypoglycemia


Cefepime HCl 1 gm/Miscellaneous Medication 1 each/ Sterile Water  10 mls @ 120 

mls/hr SLOW IVP 0600 [facility]


   Last Admin: 01/11/18 06:10 Dose:  10 mls


Insulin Detemir 35 units/ (Miscellaneous Medication)  0.35 mls @ 0 mls/hr SC HS 

[facility]


   Last Admin: 01/11/18 21:12 Dose:  0.35 mls


Vancomycin HCl 750 mg/ Sodium (Chloride)  250 mls @ 250 mls/hr IVPB 1500 [facility]


   Last Admin: 01/11/18 15:01 Dose:  250 mls


Insulin Detemir 35 units/ (Miscellaneous Medication)  0.35 mls @ 0 mls/hr SC 

QAM [facility]


Insulin Human Lispro (Humalog)  0 units SC .BEDTIME SLIDING SC PRN


   PRN Reason: Bedtime Correctional Scale


   Last Admin: 01/11/18 21:13 Dose:  5 unit


Insulin Human Lispro (Humalog)  0 units SC .AGGRESSIVE SLIDING PRN; Protocol


   PRN Reason: AGGRESSIVE SLIDING SCALE


   Last Admin: 01/11/18 19:36 Dose:  13 unit


Insulin Human Lispro (Humalog)  4 units SC 1200 [facility]


   Last Admin: 01/11/18 13:48 Dose:  4 unit


Insulin Human Lispro (Humalog)  4 units SC 1700 [facility]


   Last Admin: 01/11/18 16:47 Dose:  4 unit


Levothyroxine Sodium (Synthroid)  25 mcg PO 0600 [facility]


   Last Admin: 01/11/18 06:10 Dose:  25 mcg


Loratadine (Claritin)  10 mg PO DAILYPRN PRN


   PRN Reason: Sinus Symptoms


Magnesium Hydroxide (Milk Of Magnesium)  30 ml PO DAILYPRN PRN


   PRN Reason: Constipation


   Last Admin: 01/04/18 19:59 Dose:  30 ml


Mineral Oil/White Petrolatum (Eucerin Cream)  0 gm TOP BIDPRN PRN


   PRN Reason: Dry Skin


Mineral Oil/White Petrolatum (Eucerin Cream)  0 gm TOP BIDPRN PRN


   PRN Reason: Dry Skin


Miscellaneous Medication (Pharmacy To Dose)  1 each IVPB PRN PRN


   PRN Reason: Pharmacy to dose


Nystatin (Mycostatin Ointment)  0 gm TOP DAILY [facility]


   Last Admin: 01/11/18 11:02 Dose:  1 applic


Ondansetron HCl (Zofran Odt)  4 mg PO Q6H PRN


   PRN Reason: Nausea/Vomiting


Ondansetron HCl (Zofran)  4 mg IVP Q6H PRN


   PRN Reason: Nausea/Vomiting


Phenol (Chloraseptic Spray 180 Ml Bot)  0 ml PO PRN PRN


   PRN Reason: Sore Throat


Polyethylene Glycol (Miralax)  17 gm PO [facility]


   Last Admin: 01/11/18 21:23 Dose:  17 gm


Rosuvastatin Calcium (Crestor)  20 mg PO QAM [facility]


   Last Admin: 01/11/18 08:19 Dose:  20 mg


Senna/Docusate Sodium (Senokot S)  1 tab PO BID [facility]


   Last Admin: 01/11/18 21:11 Dose:  1 tab


Sodium Chloride (Flush - Normal Saline)  10 ml IVF Q12HR [facility]


   Last Admin: 01/11/18 21:23 Dose:  10 ml


Sodium Chloride (Flush - Normal Saline)  10 ml IVF PRN PRN


   PRN Reason: Saline Flush


   Last Admin: 01/09/18 06:11 Dose:  10 ml


Sodium Chloride ([place] Nasal Spray 0.65%)  0 ml EA NARE QIDPRN PRN


   PRN Reason: Nasal Congestion


Tamsulosin HCl (Flomax)  0.4 mg PO [facility]


   Last Admin: 01/11/18 21:11 Dose:  0.4 mg

## 2018-01-12 LAB
ANION GAP SERPL CALC-SCNC: 15 MMOL/L (ref 10–20)
BUN SERPL-MCNC: 45 MG/DL (ref 8.4–25.7)
CALCIUM SERPL-MCNC: 9 MG/DL (ref 7.8–10.44)
CHLORIDE SERPL-SCNC: 99 MMOL/L (ref 98–107)
CO2 SERPL-SCNC: 23 MMOL/L (ref 23–31)
CREAT CL PREDICTED SERPL C-G-VRATE: 51 ML/MIN (ref 70–130)
GLUCOSE SERPL-MCNC: 356 MG/DL (ref 80–115)
HGB BLD-MCNC: 8.2 G/DL (ref 14–18)
MCH RBC QN AUTO: 30.2 PG (ref 27–31)
MCV RBC AUTO: 88.8 FL (ref 80–94)
MDIFF COMPLETE?: YES
PLATELET # BLD AUTO: 342 THOU/UL (ref 130–400)
PLATELET BLD QL SMEAR: (no result)
POTASSIUM SERPL-SCNC: 5 MMOL/L (ref 3.5–5.1)
RBC # BLD AUTO: 2.7 MILL/UL (ref 4.7–6.1)
SODIUM SERPL-SCNC: 132 MMOL/L (ref 136–145)
VANCOMYCIN TROUGH SERPL-MCNC: 20 UG/ML
WBC # BLD AUTO: 8.7 THOU/UL (ref 4.8–10.8)

## 2018-01-12 RX ADMIN — HYDROCODONE BITARTRATE AND ACETAMINOPHEN PRN TAB: 5; 325 TABLET ORAL at 03:24

## 2018-01-12 RX ADMIN — INSULIN LISPRO SCH UNIT: 100 INJECTION, SOLUTION INTRAVENOUS; SUBCUTANEOUS at 13:22

## 2018-01-12 RX ADMIN — INSULIN LISPRO PRN UNIT: 100 INJECTION, SOLUTION INTRAVENOUS; SUBCUTANEOUS at 17:46

## 2018-01-12 RX ADMIN — HYDROCODONE BITARTRATE AND ACETAMINOPHEN PRN TAB: 5; 325 TABLET ORAL at 09:06

## 2018-01-12 RX ADMIN — ASPIRIN SCH MG: 81 TABLET ORAL at 08:55

## 2018-01-12 RX ADMIN — DOCUSATE SODIUM 50 MG AND SENNOSIDES 8.6 MG SCH TAB: 8.6; 5 TABLET, FILM COATED ORAL at 20:36

## 2018-01-12 RX ADMIN — Medication SCH ML: at 08:58

## 2018-01-12 RX ADMIN — HYDROCODONE BITARTRATE AND ACETAMINOPHEN PRN TAB: 5; 325 TABLET ORAL at 15:26

## 2018-01-12 RX ADMIN — INSULIN LISPRO SCH UNIT: 100 INJECTION, SOLUTION INTRAVENOUS; SUBCUTANEOUS at 17:45

## 2018-01-12 RX ADMIN — DOCUSATE SODIUM 50 MG AND SENNOSIDES 8.6 MG SCH TAB: 8.6; 5 TABLET, FILM COATED ORAL at 08:57

## 2018-01-12 RX ADMIN — INSULIN LISPRO PRN UNIT: 100 INJECTION, SOLUTION INTRAVENOUS; SUBCUTANEOUS at 21:35

## 2018-01-12 RX ADMIN — Medication SCH ML: at 21:41

## 2018-01-12 RX ADMIN — HEPARIN SODIUM SCH UNITS: 5000 INJECTION, SOLUTION INTRAVENOUS; SUBCUTANEOUS at 15:25

## 2018-01-12 RX ADMIN — HEPARIN SODIUM SCH UNITS: 5000 INJECTION, SOLUTION INTRAVENOUS; SUBCUTANEOUS at 08:57

## 2018-01-12 RX ADMIN — VANCOMYCIN HYDROCHLORIDE SCH MLS: 750 INJECTION, POWDER, LYOPHILIZED, FOR SOLUTION INTRAVENOUS at 15:26

## 2018-01-12 RX ADMIN — INSULIN LISPRO PRN UNIT: 100 INJECTION, SOLUTION INTRAVENOUS; SUBCUTANEOUS at 05:54

## 2018-01-12 RX ADMIN — HEPARIN SODIUM SCH UNITS: 5000 INJECTION, SOLUTION INTRAVENOUS; SUBCUTANEOUS at 20:36

## 2018-01-12 RX ADMIN — HYDROCODONE BITARTRATE AND ACETAMINOPHEN PRN TAB: 5; 325 TABLET ORAL at 20:37

## 2018-01-12 NOTE — PDOC.PN
- Subjective


Encounter Start Date: 01/12/18


Encounter Start Time: 09:00





No complaint expressed.





- Objective


Resuscitation Status: 


 











Resuscitation Status           FULL:Full Resuscitation














MAR Reviewed: Yes


Vital Signs & Weight: 


 Vital Signs (12 hours)











  Temp Pulse Resp BP BP Pulse Ox


 


 01/12/18 08:00  98.1 F  81  17   139/66  95


 


 01/12/18 04:42  98.3 F  79  16   133/71  100


 


 01/12/18 00:38  97.9 F  74  16   131/67  100


 


 01/11/18 21:12   79   108/53 L  


 


 01/11/18 21:10  98.0 F  79  18    100


 


 01/11/18 21:09  98.0 F  79  18   125/66  100








 Weight











Admit Weight                   264 lb 8.864 oz


 


Weight                         264 lb














I&O: 


 











 01/11/18 01/12/18 01/13/18





 06:59 06:59 06:59


 


Intake Total 2800 1950 


 


Output Total 1100 750 


 


Balance 1700 1200 











Result Diagrams: 


 01/12/18 03:47





 01/12/18 03:47


Additional Labs: 


 Accuchecks











  01/12/18 01/11/18 01/11/18





  05:34 20:41 15:34


 


POC Glucose  371 H  368 H  391 H














  01/11/18





  10:55


 


POC Glucose  374 H











Radiology Reviewed by me: Yes





Phys Exam





- Physical Examination


HEENT: sclera anicteric


Neck: no JVD


Respiratory: clear to auscultation bilateral


Cardiovascular: RRR


Gastrointestinal: soft, non-tender, no distention, positive bowel sounds


Musculoskeletal: edema present (Dressing over right leg..)





Dx/Plan


(1) Cellulitis of right lower extremity


Code(s): L03.115 - CELLULITIS OF RIGHT LOWER LIMB   Status: Acute   


Plan: 


On Antibiotics.








(2) Osteomyelitis of right tibia


Code(s): M86.9 - OSTEOMYELITIS, UNSPECIFIED   Status: Acute   Comment: Seen by 

orthopedic surgery.


On antibiotics.   





(3) Anemia of renal disease


Code(s): D63.1 - ANEMIA IN CHRONIC KIDNEY DISEASE   Status: Chronic   Comment: 

Hemoglobin has decreased, most likely blood loss from surgical procedure.


Is not symptomatic.


Check stools occult blood.   





(4) DM2 (diabetes mellitus, type 2)


Status: Chronic   


Qualifiers: 


 


Comment: BS is elevated.


Increase sliding scale.   





(5) HTN (hypertension)


Code(s): I10 - ESSENTIAL (PRIMARY) HYPERTENSION   Status: Chronic   Comment: 

stable.   





(6) CAD (coronary artery disease)


Code(s): I25.10 - ATHSCL HEART DISEASE OF NATIVE CORONARY ARTERY W/O ANG PCTRS 

  Status: Chronic   


Qualifiers: 


 


Comment: Stable.   





- Plan





* .

## 2018-01-13 LAB — VANCOMYCIN TROUGH SERPL-MCNC: 24.4 UG/ML

## 2018-01-13 RX ADMIN — Medication SCH ML: at 09:07

## 2018-01-13 RX ADMIN — DOCUSATE SODIUM 50 MG AND SENNOSIDES 8.6 MG SCH TAB: 8.6; 5 TABLET, FILM COATED ORAL at 08:57

## 2018-01-13 RX ADMIN — MAGNESIUM HYDROXIDE PRN ML: 400 SUSPENSION ORAL at 05:19

## 2018-01-13 RX ADMIN — INSULIN LISPRO PRN UNIT: 100 INJECTION, SOLUTION INTRAVENOUS; SUBCUTANEOUS at 06:15

## 2018-01-13 RX ADMIN — INSULIN LISPRO SCH UNIT: 100 INJECTION, SOLUTION INTRAVENOUS; SUBCUTANEOUS at 17:39

## 2018-01-13 RX ADMIN — INSULIN LISPRO SCH UNIT: 100 INJECTION, SOLUTION INTRAVENOUS; SUBCUTANEOUS at 12:38

## 2018-01-13 RX ADMIN — ASPIRIN SCH MG: 81 TABLET ORAL at 08:57

## 2018-01-13 RX ADMIN — HYDROCODONE BITARTRATE AND ACETAMINOPHEN PRN TAB: 5; 325 TABLET ORAL at 20:43

## 2018-01-13 RX ADMIN — HYDROCODONE BITARTRATE AND ACETAMINOPHEN PRN TAB: 5; 325 TABLET ORAL at 13:56

## 2018-01-13 RX ADMIN — HEPARIN SODIUM SCH UNITS: 5000 INJECTION, SOLUTION INTRAVENOUS; SUBCUTANEOUS at 20:39

## 2018-01-13 RX ADMIN — HEPARIN SODIUM SCH UNITS: 5000 INJECTION, SOLUTION INTRAVENOUS; SUBCUTANEOUS at 08:57

## 2018-01-13 RX ADMIN — Medication SCH ML: at 20:45

## 2018-01-13 RX ADMIN — HEPARIN SODIUM SCH UNITS: 5000 INJECTION, SOLUTION INTRAVENOUS; SUBCUTANEOUS at 15:15

## 2018-01-13 RX ADMIN — VANCOMYCIN HYDROCHLORIDE SCH MLS: 750 INJECTION, POWDER, LYOPHILIZED, FOR SOLUTION INTRAVENOUS at 15:15

## 2018-01-13 RX ADMIN — DOCUSATE SODIUM 50 MG AND SENNOSIDES 8.6 MG SCH TAB: 8.6; 5 TABLET, FILM COATED ORAL at 20:42

## 2018-01-13 NOTE — RAD
RIGHT TIBIA AND FIBULA 2 VIEWS:

 

Date:  01/13/18

 

HISTORY:  

67-year-old male with history of osteomyelitis and prior fracture. Status post hardware removal. 

 

COMPARISON:  

01/09/18. 

 

FINDINGS:

There has been removal of the previously noted large intramedullary gordon from the tibia. In its place,
 there is a small caliber gordon with what appears to be  possibly a surrounding plastic sheath or some 
methacrylate. Again noted is a chronic fracture of the proximal tibia and fibula with extensive callu
s. No significant change in alignment from the 01/09/18 study. 

 

IMPRESSION: 

Removal of the previously noted large caliber intramedullary gordon with replacement with a small calibe
r gordon. No change in alignment. 

 

 

POS: [facility]

## 2018-01-13 NOTE — CON
DATE OF CONSULTATION:  01/13/2018

 

HISTORY OF PRESENT ILLNESS:  Mr. Dexter is a pleasant 67-year-old male with a right tibia osteomyel
itis status post tibia fracture.  Antibiotics nail exchange, positive MRSA cultures.

 

PHYSICAL EXAMINATION:

VITAL SIGNS:  Afebrile, vital signs stable except for blood pressure 131/74.

EXTREMITIES:  Right lower extremity dressing clean, dry, and intact.  Knee immobilizer in place.  Sen
sate unchanged.  Motor unchanged.

 

LABORATORY DATA:  Microbiology positive for MRSA.

 

IMPRESSION:  Right tibial osteomyelitis, status post intermedullary nail exchange nailing with antibi
otic spacer.

 

PLAN:  The patient will need antibiotic nail removed in 2-3 weeks.  The patient is currently pending 
transfer to another facility to come back for removal of the nail and continue IV antibiotics.  The p
atient will be followed in-house.

## 2018-01-13 NOTE — CON
DATE OF CONSULTATION:  01/13/2018

 

CONSULTING PHYSICIAN:  Dr. Africa TENORIO.

 

REASON FOR CONSULTATION:  Chronic kidney disease.

 

REASON FOR ADMISSION:  Leg pain.

 

HISTORY OF PRESENT ILLNESS:  This is a 67-year-old male with history of type 2 
diabetes, chronic kidney disease stage 4, hypertension, who came to the 
hospital with above complaints and kidney was found to be weak and Nephrology 
was consulted.  No fever or chills.  No nausea, vomiting.  The patient is 
feeling better.  The patient was also anemic.  No chest pain or palpitation 
reported to me.  His last creatinine was 2.4, which is better than his 
admission.

 

PAST MEDICAL HISTORY:  Positive for chronic kidney disease stage 4, type 2 
diabetes, hypertension, and coronary artery disease.

 

PAST SURGICAL HISTORY:  CABG, left knee surgery, right elbow surgery, and knee 
surgery.

 

HOME MEDICATIONS:  Include calcium acetate, bumetanide, Lipitor, docusate, 
Cymbalta, fentanyl, hydralazine, Humalog, Lantus, Zofran, MiraLax, Synthroid, 
Flomax, Xanax, vitamin C, and Crestor.

 

ALLERGIES:  SULFA.

 

FAMILY HISTORY:  No history of any kidney disease.

 

REVIEW OF SYSTEMS:  The following complete review of systems was negative, 
unless otherwise mentioned in the HPI or below.

Constitutional:  Weight loss or gain, ability to conduct usual activities.

Skin:  Rash, itching.

Eyes:  Double vision, pain.

Ent/mouth:  Nose bleeding, neck stiffness, pain, tenderness.

Cardiovascular:  Palpitations, dyspnea on exertion, orthopnea.

Respiratory:  Shortness of breath, wheezing, cough, hemoptysis, fever or night 
sweats.

Gastrointestinal:  Poor appetite, abdominal pain, heartburn, nausea,vomiting, 
constipation, or diarrhea.

Genitourinary:  Urgency, frequency, dysuria, nocturia.

Musculoskeletal:  Pain, swelling.

Neurologic/psychiatric:  Anxiety, depression.

Allergy/immunologic:  Skin rash, bleeding tendency.

 

PHYSICAL EXAMINATION:

GENERAL:  This is an obese male in no apparent distress.

VITAL SIGNS:  Temperature 98.1, pulse 85, respiratory rate 18, and blood 
pressure 128/60.

HEENT:  Atraumatic, normocephalic.  Oral mucosa is moist.

NECK:  Supple.

CARDIOVASCULAR:  S1 and S2 heard.  Rate and rhythm regular.

RESPIRATORY:  Clear.

ABDOMEN:  Soft.

MUSCULOSKELETAL:  1+ edema.

DERMATOLOGIC:  No skin rash.

NEUROLOGIC:  Alert and awake.

PSYCHIATRIC:  Mood and affect normal.

 

LABORATORY AND X-RAY FINDINGS:  Potassium is 5.0, BUN is 2.4.

 

ASSESSMENT AND PLAN:

1.  Acute kidney injury on chronic kidney disease stage 4.  Renal function is 
actually better.  No acute indication for dialysis.  No fever and avoid 
nephrotoxic.

2.  Hyperkalemia, mild.  Limit potassium.

3.  Hyponatremia.

4.  Anemia, which is chronic, may need Epogen.

5.  Hypertension.  Titrate medication.

6.  Continue pain control.

7.  We will monitor renal function.

 

MTDD

## 2018-01-13 NOTE — PDOC.PN
- Subjective


Encounter Start Date: 01/13/18


Encounter Start Time: 09:30


Subjective: Expresses no specific complaint.





- Objective


Resuscitation Status: 


 











Resuscitation Status           FULL:Full Resuscitation














MAR Reviewed: Yes


Vital Signs & Weight: 


 Vital Signs (12 hours)











  Temp Pulse Resp BP Pulse Ox


 


 01/13/18 08:15  98.3 F  86  18  171/74 H  100


 


 01/13/18 08:00  98.3 F  86  18   100


 


 01/13/18 04:06  98.1 F  81  16  128/65  100


 


 01/12/18 23:48  98.0 F  81  17  122/64  100








 Weight











Admit Weight                   264 lb 8.864 oz


 


Weight                         264 lb














I&O: 


 











 01/12/18 01/13/18 01/14/18





 06:59 06:59 06:59


 


Intake Total 1950 800 


 


Output Total 750 1400 


 


Balance 1200 -600 











Result Diagrams: 


 01/12/18 03:47





 01/12/18 03:47


Additional Labs: 


 Accuchecks











  01/13/18 01/12/18 01/12/18





  05:49 20:54 16:45


 


POC Glucose  290 H  251 H  237 H














  01/12/18





  10:33


 


POC Glucose  242 H











Radiology Reviewed by me: Yes





Phys Exam





- Physical Examination


HEENT: sclera anicteric


Neck: no JVD


Respiratory: no rales


Cardiovascular: RRR


Gastrointestinal: soft


Musculoskeletal: edema present (Right leg cellulitis.)


Neurological: moves all 4 limbs


Psychiatric: normal affect, A&O x 3





Dx/Plan


(1) Cellulitis of right lower extremity


Code(s): L03.115 - CELLULITIS OF RIGHT LOWER LIMB   Status: Acute   





(2) Osteomyelitis of right tibia


Code(s): M86.9 - OSTEOMYELITIS, UNSPECIFIED   Status: Acute   Comment: Seen by 

orthopedic surgery.


On antibiotics.   





(3) Anemia of renal disease


Code(s): D63.1 - ANEMIA IN CHRONIC KIDNEY DISEASE   Status: Chronic   Comment: 

Hemoglobin has decreased, most likely blood loss from surgical procedure.


Is not symptomatic.


Check stools occult blood.   





(4) DM2 (diabetes mellitus, type 2)


Status: Chronic   


Qualifiers: 


 


Comment: BS is elevated.


Increase levemir.


Continue slidind scale.   





(5) HTN (hypertension)


Code(s): I10 - ESSENTIAL (PRIMARY) HYPERTENSION   Status: Chronic   Comment: 

stable.   





(6) CAD (coronary artery disease)


Code(s): I25.10 - ATHSCL HEART DISEASE OF NATIVE CORONARY ARTERY W/O ANG PCTRS 

  Status: Chronic   


Qualifiers: 


 


Comment: Stable.   





- Plan


-: Continue current therapy.


-: follow up stools occult blood.





* .

## 2018-01-14 RX ADMIN — DOCUSATE SODIUM 50 MG AND SENNOSIDES 8.6 MG SCH TAB: 8.6; 5 TABLET, FILM COATED ORAL at 21:16

## 2018-01-14 RX ADMIN — ASPIRIN SCH MG: 81 TABLET ORAL at 10:01

## 2018-01-14 RX ADMIN — INSULIN LISPRO PRN UNIT: 100 INJECTION, SOLUTION INTRAVENOUS; SUBCUTANEOUS at 18:11

## 2018-01-14 RX ADMIN — HYDROCODONE BITARTRATE AND ACETAMINOPHEN PRN TAB: 5; 325 TABLET ORAL at 18:14

## 2018-01-14 RX ADMIN — INSULIN LISPRO PRN UNIT: 100 INJECTION, SOLUTION INTRAVENOUS; SUBCUTANEOUS at 06:25

## 2018-01-14 RX ADMIN — Medication SCH ML: at 21:37

## 2018-01-14 RX ADMIN — INSULIN LISPRO SCH UNIT: 100 INJECTION, SOLUTION INTRAVENOUS; SUBCUTANEOUS at 18:11

## 2018-01-14 RX ADMIN — INSULIN LISPRO SCH UNIT: 100 INJECTION, SOLUTION INTRAVENOUS; SUBCUTANEOUS at 11:57

## 2018-01-14 RX ADMIN — HEPARIN SODIUM SCH UNITS: 5000 INJECTION, SOLUTION INTRAVENOUS; SUBCUTANEOUS at 21:17

## 2018-01-14 RX ADMIN — HEPARIN SODIUM SCH UNITS: 5000 INJECTION, SOLUTION INTRAVENOUS; SUBCUTANEOUS at 15:04

## 2018-01-14 RX ADMIN — INSULIN LISPRO PRN UNIT: 100 INJECTION, SOLUTION INTRAVENOUS; SUBCUTANEOUS at 11:57

## 2018-01-14 RX ADMIN — Medication SCH ML: at 10:09

## 2018-01-14 RX ADMIN — HEPARIN SODIUM SCH UNITS: 5000 INJECTION, SOLUTION INTRAVENOUS; SUBCUTANEOUS at 10:01

## 2018-01-14 RX ADMIN — HYDROCODONE BITARTRATE AND ACETAMINOPHEN PRN TAB: 5; 325 TABLET ORAL at 11:50

## 2018-01-14 RX ADMIN — DOCUSATE SODIUM 50 MG AND SENNOSIDES 8.6 MG SCH TAB: 8.6; 5 TABLET, FILM COATED ORAL at 10:01

## 2018-01-14 NOTE — PRG
DATE OF SERVICE:  01/14/2018

 

SUBJECTIVE:  Patient was seen and examined at bedside and overnight events noted.  Patient denies any
 shortness of breath or chest pain or palpitation.  No history of nausea or vomiting or diarrhea or f
ever or chills or cramps.

 

OBJECTIVE:

GENERAL:  This is an obese male in no apparent distress.

VITAL SIGNS:  Temperature 97.8, pulse 82, respiratory 18, blood pressure 130/68.

HEENT:  Atraumatic, normocephalic.  Oral mucosa is moist.

Neck:  Supple 

Cardiovascular:  S1 and S2 heard.  Rate and rhythm regular.

Respiratory:  Clear to auscultation.

Gastrointestinal:  Abdomen is soft. 

Musculoskeletal:  No tenderness, no edema.

Dermatologic:  No skin rash.

Neurologic:  Alert and awake and oriented x3.  No focal neurologic deficits.  Moving all the extremit
ies.

Psychiatric:  Mood and affect normal.

 

LABORATORY DATA:  Not done today.

 

ASSESSMENT AND PLAN:

1.  Acute kidney injury on chronic kidney disease.  We will check labs in the morning.

2.  Hyperkalemia, limit potassium.

3.  Hyponatremia.

4.  Anemia.

5.  Hypertension.

6.  Edema.

 

Plan is to check labs in the morning.  We will follow renal function.  Avoid nephrotoxins.

## 2018-01-14 NOTE — PDOC.PN
- Subjective


Encounter Start Date: 01/14/18


Encounter Start Time: 07:50





+constipation..





- Objective


Resuscitation Status: 


 











Resuscitation Status           FULL:Full Resuscitation














MAR Reviewed: Yes


Vital Signs & Weight: 


 Vital Signs (12 hours)











  Temp Pulse Resp BP BP Pulse Ox


 


 01/14/18 04:00  97.9 F  88  18   152/82 H  97


 


 01/14/18 00:37  97.8 F  83  16   130/68  96


 


 01/13/18 21:09  98.2 F  83  18   139/72  96


 


 01/13/18 20:44   83   139/72  


 


 01/13/18 20:00  98.2 F  83  18    96








 Weight











Admit Weight                   264 lb 8.864 oz


 


Weight                         264 lb














I&O: 


 











 01/13/18 01/14/18 01/15/18





 06:59 06:59 06:59


 


Intake Total 800 1990 


 


Output Total 1400 1350 


 


Balance -600 640 











Result Diagrams: 


 01/12/18 03:47





 01/12/18 03:47


Additional Labs: 


 Accuchecks











  01/14/18 01/13/18 01/13/18





  05:53 20:20 16:22


 


POC Glucose  388 H  174 H  171 H














  01/13/18





  11:16


 


POC Glucose  186 H














Phys Exam





- Physical Examination


Constitutional: NAD


HEENT: sclera anicteric


Neck: no JVD


Respiratory: no rales, clear to auscultation bilateral


Cardiovascular: RRR


Gastrointestinal: soft


Musculoskeletal: edema present (Right leg cellulitis..)


Neurological: moves all 4 limbs


Psychiatric: A&O x 3





Dx/Plan


(1) Cellulitis of right lower extremity


Code(s): L03.115 - CELLULITIS OF RIGHT LOWER LIMB   Status: Acute   


Plan: 


Continue antibiotics,


f/u with ortho.








(2) Osteomyelitis of right tibia


Code(s): M86.9 - OSTEOMYELITIS, UNSPECIFIED   Status: Acute   Comment: Seen by 

orthopedic surgery.


On antibiotics.   





(3) Anemia of renal disease


Code(s): D63.1 - ANEMIA IN CHRONIC KIDNEY DISEASE   Status: Chronic   Comment: 

Hemoglobin has decreased, most likely blood loss from surgical procedure.


Is not symptomatic.


Check stools occult blood.   





(4) DM2 (diabetes mellitus, type 2)


Status: Chronic   


Qualifiers: 


 


Comment: BS is elevated.


Increase levemir.


Continue sliding scale.


Dietician to see.   





(5) HTN (hypertension)


Code(s): I10 - ESSENTIAL (PRIMARY) HYPERTENSION   Status: Chronic   Comment: 

stable.   





(6) CAD (coronary artery disease)


Code(s): I25.10 - ATHSCL HEART DISEASE OF NATIVE CORONARY ARTERY W/O ANG PCTRS 

  Status: Chronic   


Qualifiers: 


 


Comment: Stable.   





(7) Renal insufficiency


Status: Acute   Comment: Seen by nephrology.   





- Plan


-: Continue current therapy.


-: Increase vinsulin.


-: Dietician to see.





* .

## 2018-01-15 LAB
ANION GAP SERPL CALC-SCNC: 12 MMOL/L (ref 10–20)
BUN SERPL-MCNC: 33 MG/DL (ref 8.4–25.7)
CALCIUM SERPL-MCNC: 9.4 MG/DL (ref 7.8–10.44)
CHLORIDE SERPL-SCNC: 102 MMOL/L (ref 98–107)
CO2 SERPL-SCNC: 27 MMOL/L (ref 23–31)
CREAT CL PREDICTED SERPL C-G-VRATE: 54 ML/MIN (ref 70–130)
GLUCOSE SERPL-MCNC: 148 MG/DL (ref 80–115)
POTASSIUM SERPL-SCNC: 4.7 MMOL/L (ref 3.5–5.1)
SODIUM SERPL-SCNC: 136 MMOL/L (ref 136–145)

## 2018-01-15 RX ADMIN — HYDROCODONE BITARTRATE AND ACETAMINOPHEN PRN TAB: 5; 325 TABLET ORAL at 05:53

## 2018-01-15 RX ADMIN — INSULIN LISPRO PRN UNIT: 100 INJECTION, SOLUTION INTRAVENOUS; SUBCUTANEOUS at 06:52

## 2018-01-15 RX ADMIN — HYDROCODONE BITARTRATE AND ACETAMINOPHEN PRN TAB: 5; 325 TABLET ORAL at 13:33

## 2018-01-15 RX ADMIN — INSULIN LISPRO SCH UNIT: 100 INJECTION, SOLUTION INTRAVENOUS; SUBCUTANEOUS at 17:59

## 2018-01-15 RX ADMIN — DOCUSATE SODIUM 50 MG AND SENNOSIDES 8.6 MG SCH TAB: 8.6; 5 TABLET, FILM COATED ORAL at 21:52

## 2018-01-15 RX ADMIN — Medication SCH ML: at 09:30

## 2018-01-15 RX ADMIN — HEPARIN SODIUM SCH UNITS: 5000 INJECTION, SOLUTION INTRAVENOUS; SUBCUTANEOUS at 15:53

## 2018-01-15 RX ADMIN — DOCUSATE SODIUM 50 MG AND SENNOSIDES 8.6 MG SCH TAB: 8.6; 5 TABLET, FILM COATED ORAL at 09:09

## 2018-01-15 RX ADMIN — HEPARIN SODIUM SCH UNITS: 5000 INJECTION, SOLUTION INTRAVENOUS; SUBCUTANEOUS at 09:14

## 2018-01-15 RX ADMIN — Medication SCH: at 22:03

## 2018-01-15 RX ADMIN — INSULIN LISPRO SCH UNIT: 100 INJECTION, SOLUTION INTRAVENOUS; SUBCUTANEOUS at 13:24

## 2018-01-15 RX ADMIN — HEPARIN SODIUM SCH UNITS: 5000 INJECTION, SOLUTION INTRAVENOUS; SUBCUTANEOUS at 21:55

## 2018-01-15 RX ADMIN — ASPIRIN SCH MG: 81 TABLET ORAL at 09:08

## 2018-01-15 RX ADMIN — HYDROCODONE BITARTRATE AND ACETAMINOPHEN PRN TAB: 5; 325 TABLET ORAL at 01:46

## 2018-01-15 NOTE — ULT
RENAL ULTRASOUND:

 

CLINICAL HISTORY: 

Chronic renal disease.

 

FINDINGS: 

There is incomplete assessment of the left kidney as there is persistent shadowing from bowel content
s limiting assessment.  No evidence of hydronephrosis or suspicious renal lesion of the right kidney.
  The demonstrated right renal length is approximately 11 cm.  Mild distention of the urinary bladder
 present.

 

IMPRESSION: 

1.  Incomplete assessment of the left kidney due to persistent areas of shadowing artifact from regio
nal bowel.

2.  No acute right renal pathology.

 

POS: ADRIEN

## 2018-01-15 NOTE — PRG
DATE OF SERVICE:  01/15/2018

 

SUBJECTIVE:  This is a 67-year-old gentleman being seen for acute kidney injury.  The patient denies 
any nausea, vomiting or chest pain.

 

PHYSICAL EXAMINATION:

GENERAL:  Patient is awake, alert.

VITAL SIGNS:  Afebrile, pulse 93, breathing at 16, blood pressure 132/69.

HEAD/NECK:  Normocephalic.   Atraumatic.

EYES:  EOMI.  No deformity.

EARS:  Clear.  No ulcers.

NOSE:   Intact.  No lesions.

MOUTH:  Clear.  No discharge.

THROAT:  Clear.  No exudate.

LUNGS:   Clear.  No crackles.

CARDIAC:   S1, S2.  No rub.

ABDOMEN:   Benign.  BS+.

GENITALIA/RECTUM:  Roa absent.

BACK/EXTREMITIES:  Edema 0+ Ulcer-

NEUROLOGICAL:  Alert and motor intact.                     

SKIN:  Rash- Bruise-  

LYMPHATICS:  Edema- Ulcer-

 

LABORATORY DATA:  Show hemoglobin 8.2, creatinine 2.2.

 

ASSESSMENT AND RECOMMENDATIONS:

1.  Chronic kidney disease stage 4 with acute kidney injury, stable.  We will plan renal ultrasound.

2.  Hypertension, stable.

3.  Anemia, stable.  We will need Epogen as an outpatient.

## 2018-01-15 NOTE — PDOC.PN
- Subjective


Encounter Start Date: 01/15/18


Encounter Start Time: 14:12





Mr. Dexter does not have any complaints other than he needs his " crazy pill"

. He further clarified this to mean Xanax. He denies leg pain. 





- Objective


Resuscitation Status: 


 











Resuscitation Status           FULL:Full Resuscitation














MAR Reviewed: Yes


Vital Signs & Weight: 


 Vital Signs (12 hours)











  Temp Pulse Resp BP Pulse Ox


 


 01/15/18 11:00  98.3 F  102 H  22 H  146/78 H  95


 


 01/15/18 07:30  98.3 F  93  21 H  132/69  93 L


 


 01/15/18 04:21  98.1 F  81  18  138/71  96








 Weight











Admit Weight                   264 lb 8.864 oz


 


Weight                         264 lb














I&O: 


 











 01/14/18 01/15/18 01/16/18





 06:59 06:59 06:59


 


Intake Total 1990 2020 


 


Output Total 1350 2185 


 


Balance 640 -165 











Result Diagrams: 


 01/12/18 03:47





 01/15/18 04:03


Additional Labs: 


 Accuchecks











  01/15/18 01/15/18 01/15/18





  11:30 04:04 01:42


 


POC Glucose  118 H  159 H  185 H














  01/14/18 01/14/18





  22:17 16:17


 


POC Glucose  242 H  280 H














Phys Exam





- Physical Examination


HEENT: PERRLA


Respiratory: no wheezing, no rales, no rhonchi, clear to auscultation bilateral


Cardiovascular: RRR, no significant murmur


Gastrointestinal: soft, non-tender, positive bowel sounds


Musculoskeletal: no edema





Dx/Plan


(1) Cellulitis of right lower extremity


Code(s): L03.115 - CELLULITIS OF RIGHT LOWER LIMB   Status: Acute   





(2) Osteomyelitis of right tibia


Code(s): M86.9 - OSTEOMYELITIS, UNSPECIFIED   Status: Acute   Comment: Seen by 

orthopedic surgery.


On antibiotics.   





(3) Anemia of renal disease


Code(s): D63.1 - ANEMIA IN CHRONIC KIDNEY DISEASE   Status: Chronic   Comment: 

Hemoglobin has decreased, most likely blood loss from surgical procedure.


Is not symptomatic.


Check stools occult blood.   





(4) CAD (coronary artery disease)


Code(s): I25.10 - ATHSCL HEART DISEASE OF NATIVE CORONARY ARTERY W/O ANG PCTRS 

  Status: Chronic   


Qualifiers: 


 


Comment: Stable.   





(5) DM2 (diabetes mellitus, type 2)


Status: Chronic   


Qualifiers: 


 


Comment: BS is elevated.


Increase levemir.


Continue sliding scale.


Dietician to see.   





(6) HTN (hypertension)


Code(s): I10 - ESSENTIAL (PRIMARY) HYPERTENSION   Status: Chronic   Comment: 

stable.   





(7) CKD (chronic kidney disease) stage 4, GFR 15-29 ml/min


Code(s): N18.4 - CHRONIC KIDNEY DISEASE, STAGE 4 (SEVERE)   Status: Acute   





- Plan





* Cellulitis, and Osteomyelitis of the right lower extremity- Patient will need 

to be on Vancomycin until March 4th, and then Minocin for 6 months


* CKD- stage 4- renal function is stable


* HTN- blood pressure is stable


* DM- blood glucose is stable


* Awaiting surgery to remove antibiotic nail.

## 2018-01-16 LAB
ANION GAP SERPL CALC-SCNC: 13 MMOL/L (ref 10–20)
BASOPHILS # BLD AUTO: 0 THOU/UL (ref 0–0.2)
BASOPHILS NFR BLD AUTO: 0.5 % (ref 0–1)
BUN SERPL-MCNC: 34 MG/DL (ref 8.4–25.7)
CALCIUM SERPL-MCNC: 9.2 MG/DL (ref 7.8–10.44)
CHLORIDE SERPL-SCNC: 100 MMOL/L (ref 98–107)
CO2 SERPL-SCNC: 27 MMOL/L (ref 23–31)
CREAT CL PREDICTED SERPL C-G-VRATE: 50 ML/MIN (ref 70–130)
EOSINOPHIL # BLD AUTO: 0.2 THOU/UL (ref 0–0.7)
EOSINOPHIL NFR BLD AUTO: 2.1 % (ref 0–10)
GLUCOSE SERPL-MCNC: 267 MG/DL (ref 80–115)
HGB BLD-MCNC: 8.3 G/DL (ref 14–18)
LYMPHOCYTES # BLD: 1.6 THOU/UL (ref 1.2–3.4)
LYMPHOCYTES NFR BLD AUTO: 18.7 % (ref 21–51)
MCH RBC QN AUTO: 29.5 PG (ref 27–31)
MCV RBC AUTO: 89.6 FL (ref 80–94)
MONOCYTES # BLD AUTO: 0.5 THOU/UL (ref 0.11–0.59)
MONOCYTES NFR BLD AUTO: 6.2 % (ref 0–10)
NEUTROPHILS # BLD AUTO: 6.1 THOU/UL (ref 1.4–6.5)
NEUTROPHILS NFR BLD AUTO: 72.6 % (ref 42–75)
PLATELET # BLD AUTO: 329 THOU/UL (ref 130–400)
POTASSIUM SERPL-SCNC: 5 MMOL/L (ref 3.5–5.1)
RBC # BLD AUTO: 2.82 MILL/UL (ref 4.7–6.1)
SODIUM SERPL-SCNC: 135 MMOL/L (ref 136–145)
VANCOMYCIN TROUGH SERPL-MCNC: 23.3 UG/ML
WBC # BLD AUTO: 8.4 THOU/UL (ref 4.8–10.8)

## 2018-01-16 RX ADMIN — DOCUSATE SODIUM 50 MG AND SENNOSIDES 8.6 MG SCH: 8.6; 5 TABLET, FILM COATED ORAL at 19:58

## 2018-01-16 RX ADMIN — HEPARIN SODIUM SCH UNITS: 5000 INJECTION, SOLUTION INTRAVENOUS; SUBCUTANEOUS at 08:21

## 2018-01-16 RX ADMIN — HYDROCODONE BITARTRATE AND ACETAMINOPHEN PRN TAB: 5; 325 TABLET ORAL at 14:06

## 2018-01-16 RX ADMIN — INSULIN LISPRO PRN UNIT: 100 INJECTION, SOLUTION INTRAVENOUS; SUBCUTANEOUS at 06:51

## 2018-01-16 RX ADMIN — HYDROCODONE BITARTRATE AND ACETAMINOPHEN PRN TAB: 5; 325 TABLET ORAL at 05:31

## 2018-01-16 RX ADMIN — INSULIN LISPRO PRN UNIT: 100 INJECTION, SOLUTION INTRAVENOUS; SUBCUTANEOUS at 16:34

## 2018-01-16 RX ADMIN — ASPIRIN SCH MG: 81 TABLET ORAL at 08:22

## 2018-01-16 RX ADMIN — HYDROCODONE BITARTRATE AND ACETAMINOPHEN PRN TAB: 5; 325 TABLET ORAL at 21:30

## 2018-01-16 RX ADMIN — DOCUSATE SODIUM 50 MG AND SENNOSIDES 8.6 MG SCH: 8.6; 5 TABLET, FILM COATED ORAL at 08:24

## 2018-01-16 RX ADMIN — HEPARIN SODIUM SCH UNITS: 5000 INJECTION, SOLUTION INTRAVENOUS; SUBCUTANEOUS at 14:07

## 2018-01-16 RX ADMIN — HEPARIN SODIUM SCH UNITS: 5000 INJECTION, SOLUTION INTRAVENOUS; SUBCUTANEOUS at 21:32

## 2018-01-16 RX ADMIN — Medication SCH ML: at 08:24

## 2018-01-16 RX ADMIN — Medication SCH ML: at 21:33

## 2018-01-16 RX ADMIN — INSULIN LISPRO SCH UNIT: 100 INJECTION, SOLUTION INTRAVENOUS; SUBCUTANEOUS at 12:23

## 2018-01-16 RX ADMIN — INSULIN LISPRO SCH UNIT: 100 INJECTION, SOLUTION INTRAVENOUS; SUBCUTANEOUS at 16:33

## 2018-01-16 NOTE — PDOC.PN
- Subjective


Encounter Start Date: 01/16/18


Encounter Start Time: 15:50





Mr. Dexter was seen today in follow-up of right lower extremity cellulitis. 

He does not have any complaints.





- Objective


Resuscitation Status: 


 











Resuscitation Status           FULL:Full Resuscitation














MAR Reviewed: Yes


Vital Signs & Weight: 


 Vital Signs (12 hours)











  Temp Pulse Resp BP BP Pulse Ox


 


 01/16/18 12:23   88   148/77 H  


 


 01/16/18 12:18  97.9 F  88  20   148/77 H  97


 


 01/16/18 08:40  97.9 F  88  20    97


 


 01/16/18 08:22   85   163/76 H  


 


 01/16/18 08:05  98.2 F  83  16   163/76 H  95


 


 01/16/18 04:50  98.0 F  85  16   162/78 H  95








 Weight











Admit Weight                   264 lb 8.864 oz


 


Weight                         264 lb














I&O: 


 











 01/15/18 01/16/18 01/17/18





 06:59 06:59 06:59


 


Intake Total 2020 1790 


 


Output Total 2185 1650 


 


Balance -165 140 











Result Diagrams: 


 01/16/18 12:19





 01/16/18 12:19


Additional Labs: 


 Accuchecks











  01/16/18 01/16/18 01/15/18





  11:21 05:36 21:21


 


POC Glucose  277 H  274 H  148 H














  01/15/18





  16:18


 


POC Glucose  112 H














Phys Exam





- Physical Examination


HEENT: PERRLA


Respiratory: no wheezing, no rales, no rhonchi, clear to auscultation bilateral


Cardiovascular: RRR, no significant murmur


Gastrointestinal: soft, positive bowel sounds


+ trace edema, mild bruising





Dx/Plan


(1) Cellulitis of right lower extremity


Code(s): L03.115 - CELLULITIS OF RIGHT LOWER LIMB   Status: Acute   





(2) Osteomyelitis of right tibia


Code(s): M86.9 - OSTEOMYELITIS, UNSPECIFIED   Status: Acute   Comment: Seen by 

orthopedic surgery.


On antibiotics.   





(3) Anemia of renal disease


Code(s): D63.1 - ANEMIA IN CHRONIC KIDNEY DISEASE   Status: Chronic   Comment: 

Hemoglobin has decreased, most likely blood loss from surgical procedure.


Is not symptomatic.


Check stools occult blood.   





(4) CAD (coronary artery disease)


Code(s): I25.10 - ATHSCL HEART DISEASE OF NATIVE CORONARY ARTERY W/O ANG PCTRS 

  Status: Chronic   


Qualifiers: 


 


Comment: Stable.   





(5) DM2 (diabetes mellitus, type 2)


Status: Chronic   


Qualifiers: 


 


Comment: BS is elevated.


Increase levemir.


Continue sliding scale.


Dietician to see.   





(6) HTN (hypertension)


Code(s): I10 - ESSENTIAL (PRIMARY) HYPERTENSION   Status: Chronic   Comment: 

stable.   





(7) CKD (chronic kidney disease) stage 4, GFR 15-29 ml/min


Code(s): N18.4 - CHRONIC KIDNEY DISEASE, STAGE 4 (SEVERE)   Status: Acute   





- Plan





* Cellulitis and Osteomyelitis of the right leg, and tibia( respectively) - 

continue IC Vancomycin


* HTN- blood pressure is stable


* DM- blood glucose is stable


* CKD- stable


* Awaiting placement, and removal of Antibiotic nail.

## 2018-01-16 NOTE — PRG
DATE OF SERVICE:  01/16/2018

 

SUBJECTIVE:  This is a 67-year-old gentleman being seen for acute kidney injury.  The patient denies 
any nausea, vomiting or chest pain.

 

PHYSICAL EXAMINATION:

GENERAL:  Patient is awake, alert.

VITAL SIGNS:  Afebrile, pulse 85, breathing at 16, blood pressure 162/78.

HEAD/NECK:  Normocephalic.   Atraumatic.

EYES:  EOMI.  No deformity.

EARS:  Clear.  No ulcers.

NOSE:   Intact.  No lesions.

MOUTH:  Clear.  No discharge.

THROAT:  Clear.  No exudate.

LUNGS:   Clear.  No crackles.

CARDIAC:   S1, S2.  No rub.

ABDOMEN:   Benign.  BS+.

GENITALIA/RECTUM:  Roa absent.

BACK/EXTREMITIES:  Edema 0+ Ulcer-

NEUROLOGICAL:  Alert and motor intact.                     

SKIN:  Rash- Bruise-  

LYMPHATICS:  Edema- Ulcer-

 

LABORATORY DATA:  Pending.

 

ASSESSMENT AND PLAN:

1.  Stage 4 chronic kidney disease with acute kidney injury.  Recheck labs.

2.  Hypertension, stable.

3.  Anemia.  We will recheck hemoglobin.  No urgent indication for dialysis.  We will follow the theresa
ent's renal function closely.

## 2018-01-17 VITALS — SYSTOLIC BLOOD PRESSURE: 156 MMHG | DIASTOLIC BLOOD PRESSURE: 79 MMHG

## 2018-01-17 VITALS — TEMPERATURE: 98 F

## 2018-01-17 LAB
ANION GAP SERPL CALC-SCNC: 12 MMOL/L (ref 10–20)
BASOPHILS # BLD AUTO: 0 THOU/UL (ref 0–0.2)
BASOPHILS NFR BLD AUTO: 0.4 % (ref 0–1)
BUN SERPL-MCNC: 37 MG/DL (ref 8.4–25.7)
CALCIUM SERPL-MCNC: 9.1 MG/DL (ref 7.8–10.44)
CHLORIDE SERPL-SCNC: 103 MMOL/L (ref 98–107)
CO2 SERPL-SCNC: 28 MMOL/L (ref 23–31)
CREAT CL PREDICTED SERPL C-G-VRATE: 53 ML/MIN (ref 70–130)
EOSINOPHIL # BLD AUTO: 0.3 THOU/UL (ref 0–0.7)
EOSINOPHIL NFR BLD AUTO: 3 % (ref 0–10)
GLUCOSE SERPL-MCNC: 81 MG/DL (ref 80–115)
HGB BLD-MCNC: 8.4 G/DL (ref 14–18)
LYMPHOCYTES # BLD: 2 THOU/UL (ref 1.2–3.4)
LYMPHOCYTES NFR BLD AUTO: 22 % (ref 21–51)
MCH RBC QN AUTO: 28.5 PG (ref 27–31)
MCV RBC AUTO: 89.5 FL (ref 80–94)
MONOCYTES # BLD AUTO: 0.6 THOU/UL (ref 0.11–0.59)
MONOCYTES NFR BLD AUTO: 7.1 % (ref 0–10)
NEUTROPHILS # BLD AUTO: 6 THOU/UL (ref 1.4–6.5)
NEUTROPHILS NFR BLD AUTO: 67.6 % (ref 42–75)
PLATELET # BLD AUTO: 326 THOU/UL (ref 130–400)
POTASSIUM SERPL-SCNC: 4.9 MMOL/L (ref 3.5–5.1)
RBC # BLD AUTO: 2.93 MILL/UL (ref 4.7–6.1)
SODIUM SERPL-SCNC: 138 MMOL/L (ref 136–145)
WBC # BLD AUTO: 8.8 THOU/UL (ref 4.8–10.8)

## 2018-01-17 RX ADMIN — ASPIRIN SCH MG: 81 TABLET ORAL at 10:18

## 2018-01-17 RX ADMIN — Medication SCH ML: at 10:20

## 2018-01-17 RX ADMIN — DOCUSATE SODIUM 50 MG AND SENNOSIDES 8.6 MG SCH TAB: 8.6; 5 TABLET, FILM COATED ORAL at 10:18

## 2018-01-17 RX ADMIN — HEPARIN SODIUM SCH UNITS: 5000 INJECTION, SOLUTION INTRAVENOUS; SUBCUTANEOUS at 10:17

## 2018-01-17 RX ADMIN — INSULIN LISPRO SCH UNIT: 100 INJECTION, SOLUTION INTRAVENOUS; SUBCUTANEOUS at 13:30

## 2018-01-17 RX ADMIN — HYDROCODONE BITARTRATE AND ACETAMINOPHEN PRN TAB: 5; 325 TABLET ORAL at 14:05

## 2018-01-17 RX ADMIN — HYDROCODONE BITARTRATE AND ACETAMINOPHEN PRN TAB: 5; 325 TABLET ORAL at 03:17

## 2018-01-17 NOTE — PRG
DATE OF SERVICE:  01/17/2018

 

SUBJECTIVE:  This is a 67-year-old gentleman being seen for acute kidney 
injury.  The patient denies any nausea, vomiting, or chest pain.

 

PHYSICAL EXAMINATION:

GENERAL:  Patient is awake, alert.

VITAL SIGNS:  Afebrile, pulse 81, breathing at 16, blood pressure 134/67.

GENERAL APPEARANCE AND MENTAL STATUS:  Fair.

HEAD/NECK:  Normocephalic.   Atraumatic.

EYES:  EOMI.  No deformity.

EARS:  Clear.  No ulcers.

NOSE:   Intact.  No lesions.

MOUTH:  Clear.  No discharge.

THROAT:  Clear.  No exudate.

LUNGS:   Clear.  No crackles.

CARDIAC:   S1, S2.  No rub.

ABDOMEN:   Benign.  BS+.

GENITALIA/RECTUM:  Roa absent.

BACK/EXTREMITIES:  Edema 0+ Ulcer-

NEUROLOGICAL:  Alert and motor intact.                     

SKIN:  Rash- Bruise-  

LYMPHATICS:  Edema- Ulcer- 

LABORATORY:  Hemoglobin 8.3, creatinine 2.4.

 

ASSESSMENT:

1.  Stage 4 chronic kidney disease, stable.

2.  Hypertension, stable.

3.  Anemia, stable.

4.  Hyperkalemia.  Recommend a low potassium diet. 

 

Will recheck labs today.

 

MTDD

## 2018-01-17 NOTE — DIS
DATE OF ADMISSION:  01/02/2018

 

DATE OF DISCHARGE:  01/17/2018

 

DISCHARGE DISPOSITION:  Back to his assisted living facility.

 

DISCHARGE DIAGNOSES:

1.  Cellulitis and osteomyelitis of the right leg and right tibia.

2.  Diabetes mellitus

3.  Deconditioning.

4.  Chronic kidney disease stage IV.

5.  Hypertension.

6.  Coronary artery disease.

7.  Diabetes mellitus type 1.

 

DISCHARGE MEDICATIONS:  Include vancomycin 750 mg every other day until 03/04.  He is to continue Tyl
enol 500 mg q.6 h. as needed, alprazolam 0.5 mg t.i.d. and at bedtime as needed, ascorbic acid 500 mg
 daily, aspirin 81 mg daily, Lipitor 40 mg daily, bumetanide 0.5 mg daily, calcium acetate 1334 mg t.
i.d., Dexilant 60 mg daily, Cymbalta 60 mg twice a day, Duragesic patch 25 mcg every 3 days, Humalog 
sliding scale, hydralazine 25 mg q.i.d., Norco 5/325 to 7.5 mg q.6 h. as needed, hydroxyzine 25 mg t.
i.d., Levemir insulin 45 units subq q.a.m., levothyroxine 25 mcg daily, magnesium hydroxide p.r.n., m
ultivitamin once a day, Zofran 4 mg as needed, Crestor 20 mg daily, Senokot p.r.n., Flomax 0.4 mg antonio
ly.

 

PROCEDURES DONE DURING ADMISSION:  The patient had a nuclear bone scan showing findings suspicious of
 osteomyelitis and the proximal tibia.  The patient also had removal of hardware of 3 locking screws 
and a nail, also a bone biopsy and irrigation and placement of an intramedullary nailing antibiotic s
pacer.

 

CODE STATUS:  FULL CODE.

 

ALLERGIES:  To SULFA.

 

HOSPITAL COURSE:  Mr. Dexter is a pleasant 67-year-old who was admitted for worsening redness in hi
s legs as well as swelling and pain.  He was found to have a recurrence of cellulitis in the right lo
wer extremity.  It was discovered that he also had the development of osteomyelitis and the tibia.  SHANITA
e was started on IV antibiotics.  The bacterial culture from the leg tissue grew methicillin-resistan
t Staph and he was seen by Dr. Eisenberg, who recommended IV vancomycin for about 6-8 weeks.  Therapy artie
l be ending on 03/04.  The patient had removal of the infected hardware and placement of an antibioti
c nail.  He will need to have this removed approximately 1 week from the time of his discharge here a
t our facility.  In the interim, he will be transferred back to his assisted living facility on the I
V antibiotics and then return in a week to have the antibiotic nail removed.

## 2018-01-25 ENCOUNTER — HOSPITAL ENCOUNTER (INPATIENT)
Dept: HOSPITAL 92 - SDC | Age: 68
LOS: 2 days | Discharge: NURSING, REHAB OR LONG-TERM CARE | DRG: 496 | End: 2018-01-27
Attending: ORTHOPAEDIC SURGERY | Admitting: ORTHOPAEDIC SURGERY
Payer: MEDICARE

## 2018-01-25 VITALS — BODY MASS INDEX: 0.4 KG/M2

## 2018-01-25 DIAGNOSIS — N18.4: ICD-10-CM

## 2018-01-25 DIAGNOSIS — N17.9: ICD-10-CM

## 2018-01-25 DIAGNOSIS — T84.622A: Primary | ICD-10-CM

## 2018-01-25 DIAGNOSIS — Z95.1: ICD-10-CM

## 2018-01-25 DIAGNOSIS — I25.10: ICD-10-CM

## 2018-01-25 DIAGNOSIS — E10.22: ICD-10-CM

## 2018-01-25 DIAGNOSIS — I12.9: ICD-10-CM

## 2018-01-25 DIAGNOSIS — E10.65: ICD-10-CM

## 2018-01-25 DIAGNOSIS — M86.8X6: ICD-10-CM

## 2018-01-25 DIAGNOSIS — D63.8: ICD-10-CM
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
## 2018-01-25 DIAGNOSIS — T81.4XXD: ICD-10-CM

## 2018-01-25 DIAGNOSIS — E66.9: ICD-10-CM

## 2018-01-25 LAB
ANION GAP SERPL CALC-SCNC: 11 MMOL/L (ref 10–20)
APTT PPP: 37.6 SEC (ref 22.9–36.1)
BASOPHILS # BLD AUTO: 0.1 THOU/UL (ref 0–0.2)
BASOPHILS NFR BLD AUTO: 0.9 % (ref 0–1)
BUN SERPL-MCNC: 44 MG/DL (ref 8.4–25.7)
CALCIUM SERPL-MCNC: 9 MG/DL (ref 7.8–10.44)
CHLORIDE SERPL-SCNC: 103 MMOL/L (ref 98–107)
CO2 SERPL-SCNC: 23 MMOL/L (ref 23–31)
CREAT CL PREDICTED SERPL C-G-VRATE: 48 ML/MIN (ref 70–130)
EOSINOPHIL # BLD AUTO: 0.2 THOU/UL (ref 0–0.7)
EOSINOPHIL NFR BLD AUTO: 2.3 % (ref 0–10)
GLUCOSE SERPL-MCNC: 268 MG/DL (ref 80–115)
HGB BLD-MCNC: 7.6 G/DL (ref 14–18)
INR PPP: 1.1
LYMPHOCYTES # BLD: 1.5 THOU/UL (ref 1.2–3.4)
LYMPHOCYTES NFR BLD AUTO: 16.8 % (ref 21–51)
MCH RBC QN AUTO: 29.7 PG (ref 27–31)
MCV RBC AUTO: 89.6 FL (ref 80–94)
MONOCYTES # BLD AUTO: 0.5 THOU/UL (ref 0.11–0.59)
MONOCYTES NFR BLD AUTO: 5.3 % (ref 0–10)
NEUTROPHILS # BLD AUTO: 6.4 THOU/UL (ref 1.4–6.5)
NEUTROPHILS NFR BLD AUTO: 74.7 % (ref 42–75)
PLATELET # BLD AUTO: 249 THOU/UL (ref 130–400)
POTASSIUM SERPL-SCNC: 4.4 MMOL/L (ref 3.5–5.1)
PROTHROMBIN TIME: 13.8 SEC (ref 12–14.7)
RBC # BLD AUTO: 2.55 MILL/UL (ref 4.7–6.1)
SODIUM SERPL-SCNC: 133 MMOL/L (ref 136–145)
WBC # BLD AUTO: 8.6 THOU/UL (ref 4.8–10.8)

## 2018-01-25 PROCEDURE — 86850 RBC ANTIBODY SCREEN: CPT

## 2018-01-25 PROCEDURE — 87040 BLOOD CULTURE FOR BACTERIA: CPT

## 2018-01-25 PROCEDURE — 94640 AIRWAY INHALATION TREATMENT: CPT

## 2018-01-25 PROCEDURE — 86900 BLOOD TYPING SEROLOGIC ABO: CPT

## 2018-01-25 PROCEDURE — 80053 COMPREHEN METABOLIC PANEL: CPT

## 2018-01-25 PROCEDURE — 86901 BLOOD TYPING SEROLOGIC RH(D): CPT

## 2018-01-25 PROCEDURE — 83036 HEMOGLOBIN GLYCOSYLATED A1C: CPT

## 2018-01-25 PROCEDURE — 0QPG04Z REMOVAL OF INTERNAL FIXATION DEVICE FROM RIGHT TIBIA, OPEN APPROACH: ICD-10-PCS | Performed by: ORTHOPAEDIC SURGERY

## 2018-01-25 PROCEDURE — 85610 PROTHROMBIN TIME: CPT

## 2018-01-25 PROCEDURE — 80048 BASIC METABOLIC PNL TOTAL CA: CPT

## 2018-01-25 PROCEDURE — 36416 COLLJ CAPILLARY BLOOD SPEC: CPT

## 2018-01-25 PROCEDURE — 36415 COLL VENOUS BLD VENIPUNCTURE: CPT

## 2018-01-25 PROCEDURE — 85025 COMPLETE CBC W/AUTO DIFF WBC: CPT

## 2018-01-25 PROCEDURE — 85730 THROMBOPLASTIN TIME PARTIAL: CPT

## 2018-01-25 NOTE — OP
DATE OF SERVICE:  01/25/2018 

 

PREOPERATIVE DIAGNOSES:  Right proximal tibial fracture with intramedullary nailing with osteomyeliti
s, postoperative infection, and status post implantation of antibiotic nail.

 

PROCEDURES PERFORMED:

1.  Irrigation and debridement on of bone/osteomyelitis, right tibia.

2.  Removal of antibiotic nail.

 

STAFF:  Jordan Hagan M.D.

 

ASSISTANT:  Abel Springer PA-C

 

ANESTHESIA:  _____.  The patient received general endotracheal intubation.

 

ESTIMATED BLOOD LOSS:  100 mL

 

TOURNIQUET TIME:  None.

 

IMPLANTS:  None.

 

EXPLANTS:  Hubcap with antibiotic spacer.

 

ANTIBIOTICS:  Patient is currently on scheduled vancomycin.

 

COMPLICATIONS:  None.

 

HISTORY OF PRESENT ILLNESS:  Mr. Dexter is a pleasant 67-year-old male with diabetes, coronary valdemar
ry disease, end-stage renal disease, and osteomyelitis of right leg.  The patient has been followed b
y my service for almost a calendar year.  The patient had a recent antibiotic nail placed, states linda
t he is currently feeling good, has no pain, ambulating on his right leg.  I discussed him preoperati
vely risks and benefits of I&D of the tibia with removal of antibiotic spacer and need for further pr
ocedures.  The patient and family understood the risks and benefits of procedure to include pain, sca
r, bleeding, infection, damage to vital structures, decreased range of motion or strength, failure of
 failure, continued pain despite intervention, need for further surgeries, loss of life or limb.  The
 patient and family understood the risks and benefits of procedure and elected to proceed.

 

PROCEDURE IN DETAIL:  After timeout was performed, the patient's right lower extremity as the operati
ve site based on sight, consents, and markings.  After timeout, the patient's right extremity was pre
pped and draped in sterile fashion.  The sutures had been removed before the procedure.  All sites of
 proximal screw holes with distal screw holes used to bluntly open up the skin.  I found the tendon t
ear which we cut and removed excess.  We then exposed and came down on the patient's nail.  We remove
d the hubcap as well as the entire antibiotic nail.  We curetted out the canal.  We then washed with 
5 liters of fluid.  We used the distal tibia screw hole, washed fluid through distally to help with e
vacuation, we completed our washout.  We then closed with 0 PDS closed the tendon and Prolene for the
 skin.  The patient will be placed in a knee immobilizer, do weightbearing as tolerated once the sutu
res are taken out in 2-3 weeks postop.  The patient will be admitted to hospital and continue to have
 antibiotics, post-op pain control, and eventually transfer back to [facility].  The patient's out
come is guarded, and he potentially may require long-term antibiotics for suppression.

## 2018-01-25 NOTE — HP
CHIEF COMPLAINT:  Right lower extremity pain.

 

HISTORY OF PRESENT ILLNESS:  Mr. Dexter is a 67-year-old male who is well known to our service.  He
 has had a tibia fracture last summer of 2017, which became infected.  The patient was treated with I
V antibiotics in an attempt to keep the nail in place for removal.  Later, the patient was in a [facility] condition health until about had a graft for an unhealed wound by Dr. Kim in November and had 
a cellulitis, increasing pain in his right leg.  Given this, seeing the films on the patient's x-ray 
had healed.  I felt that I would at that point discussed with him removing his nail.  I placed an ant
ibiotic spacer in early of 01/2018.  The patient then was admitted afterwards and then was sent to [facility] where he was previously living.  The patient was overnight admitted for elevated blood serrano
gars.  The patient has a history of elevated blood sugars.  He understood that we are planning to alondra
e the antibiotic nail out that we placed at about 2 weeks postoperative.  The plan is he will be geraldine marks for that plan of course.

 

PAST MEDICAL HISTORY:  Diabetes type 1, chronic kidney disease stage 4, hypertension, coronary artery
 disease.

 

PAST SURGICAL HISTORY:  Coronary artery bypass, left ankle and right elbow surgery not otherwise spec
ified, right fifth toe amputation, the above stated tibia nail removal grafting.

 

MEDICATIONS:  Please see administration full list.

 

SOCIAL HISTORY:  Patient is a nonsmoker, nondrinker.  Two children, .  His son is _____ Lisa welch.

 

FAMILY HISTORY:  Significant for diabetes and coronary artery disease.

 

ALLERGIES:  SULFA.

 

PHYSICAL EXAMINATION:

GENERAL:  Alert and oriented male in no acute distress, resting comfortably in bed.

HEENT:  Extraocular muscles intact.

LUNGS:  Unlabored, nontender to palpation, bilateral flanks.

CARDIOVASCULAR:  Regular rate.

EXTREMITIES:  The patient's right lower extremity has got chronic venous stasis changes.  The patient
's sutures are clean, dry, and intact.  There is no gross erythema or drainage noted.  The patient ha
s dry, scaly skin.  The patient's exam continues to have minimal flexion and extension of his toes.  


 

LABORATORY DATA:  The patient's most recent hemoglobin is 7.6.

 

IMPRESSION:

1.  Right tibia nail with postoperative infection, osteomyelitis, status post antibiotic spacer nail 
placement.

2.  Diabetes.

3.  Hypertension.

4.  Coronary artery disease.

5.  Obesity.

6.  Anemia of chronic disease.

 

ASSESSMENT AND PLAN:  The patient will undergo antibiotics nail removal versus exchange as needed.  H
e undergone I&D and closure.  The patient will be admitted to the hospital for IV antibiotics and lik
ely be discharged home, followed by medicine.  I discussed with patient the risks and benefits of jason ro and he understands.  He is currently not actually in any pain since we have done the antibiotic 
nail, he has been ambulating on his leg.  We will follow up with the patient in the hospital postoper
atively.

## 2018-01-25 NOTE — PDOC.PN
- Subjective


Encounter Start Date: 01/25/18


Encounter Start Time: 15:15


patient is seen today, alert  and oriented, he is S/p Right TIbial Fracture 

with intramedullary nail which was infected and diagnosed osteomyelitis on 

vancomycin, the infected nail is reomved and irrigated and then replaced with 

new Antibiotics Nail implantation, Patient has known h/o DM type2 on insulin 

pump a home,known HTN, with h/o CABG. Patient is post op day 1 today, he is on 

pain pump well controlled. He has back pain. No other concenr snoted.





- Objective


MAR Reviewed: Yes


Vital Signs & Weight: 


 Weight











Weight                         265 lb














Additional Labs: 


 Accuchecks











  01/25/18 01/25/18 01/25/18





  11:06 09:31 08:51


 


POC Glucose  238 H  249 H  327 H











Radiology Reviewed by me: Yes





Phys Exam





- Physical Examination


HEENT: PERRLA, moist MMs


Neck: no nodes, no JVD


Respiratory: no wheezing, no rales


Cardiovascular: RRR, no significant murmur


Gastrointestinal: soft, non-tender


Musculoskeletal: edema present (right leg in Cast)


Neurological: non-focal, normal sensation


Lymphatic: no nodes





Dx/Plan


(1) Osteomyelitis of right tibia


Code(s): M86.9 - OSTEOMYELITIS, UNSPECIFIED   Status: Acute   


Qualifiers: 


   Osteomyelitis type: other acute   Qualified Code(s): M86.161 - Other acute 

osteomyelitis, right tibia and fibula   


Comment: Seen by orthopedic surgwey Post op today day 1 , Maryam has 

intramedulary removal and rteplaceing with new one, Will get CBC and Blood 

Cultures. discussed with PA of Dr. Hagan, said he would discuss with his 

doctor about Antibitoics, his last Blood Cultures were positive for MRSA on 1/9/ 18.   





(2) CAD (coronary artery disease)


Code(s): I25.10 - ATHSCL HEART DISEASE OF NATIVE CORONARY ARTERY W/O ANG PCTRS 

  Status: Chronic   


Qualifiers: 


 


Comment: Stable. Will need to restart on Aspirin as sonn as Ortho feel 

appropriate to do so. wait for ortho recommedations.   





(3) DM2 (diabetes mellitus, type 2)


Status: Chronic   


Qualifiers: 


 


Comment: Will restart on Home dose levemir and SSI.  Plan to Keep -180, 

titrate up the levemir as needed.   





(4) HTN (hypertension)


Code(s): I10 - ESSENTIAL (PRIMARY) HYPERTENSION   Status: Chronic   Comment: 

stable. restart pt on home Meds. Keep BP <130/80   





- Plan


cont current plan of care, continue antibiotics, PT/OT, , 

incentive spirometry, DVT proph w/SCDs





* .








- Discharge Day


Encounter end time: 16:00





Review of Systems





- Review of Systems


Constitutional: weakness


Eyes: negative: Pain, Vision Change, Conjunctivae Inflammation, Eyelid 

Inflammation, Redness, Other


ENT: negative: Ear Pain, Ear Discharge, Nose Pain, Nose Discharge, Nose 

Congestion, Mouth Pain, Mouth Swelling, Throat Pain, Throat Swelling, Other


Respiratory: negative: Cough, Dry, Shortness of Breath, Hemoptysis, SOB with 

Excertion, Pleuritic Pain, Sputum, Wheezing


Cardiovascular: negative: chest pain, palpitations, orthopnea, paroxysmal 

nocturnal dyspnea, edema, light headedness, other


Gastrointestinal: negative: Nausea, Vomiting, Abdominal Pain, Diarrhea, 

Constipation, Melena, Hematochezia, Other


Genitourinary: negative: Dysuria, Frequency, Incontinence, Hematuria, Retention

, Other


Musculoskeletal: Leg Pain





- Medications/Allergies


Allergies/Adverse Reactions: 


 Allergies











Allergy/AdvReac Type Severity Reaction Status Date / Time


 


Sulfa (Sulfonamide Allergy   Verified 01/24/18 15:37





Antibiotics)     











Medications: 


 Current Medications





Acetaminophen (Tylenol)  650 mg PO Q6H PRN


   PRN Reason: Headache/Temp >101F/Mild Pain


Albuterol/Ipratropium (Duoneb)  3 ml NEB TID-RT [facility]


Atorvastatin Calcium (Lipitor)  40 mg PO HS [facility]


Bisacodyl (Dulcolax)  10 mg NE DAILYPRN PRN


   PRN Reason: Constipation


Bisacodyl (Dulcolax)  10 mg NE Q8H PRN


   PRN Reason: Constipation


Bumetanide (Bumex)  0.5 mg PO BID-AC [facility]


Calcium Acetate (Phoslo)  1,334 mg PO TID-WM [facility]


Dextrose/Water (Dextrose 50%)  25 gm SLOW IVP PRN PRN


   PRN Reason: Hypoglycemia


Diphenhydramine HCl (Benadryl)  25 mg IVP Q3H PRN


   PRN Reason: Itching


Diphenhydramine HCl (Benadryl)  25 mg PO Q3H PRN


   PRN Reason: Itching


Diphenhydramine HCl (Benadryl)  25 mg IM Q3H PRN


   PRN Reason: Itching


Docusate Sodium (Colace)  100 mg PO BID [facility]


Duloxetine HCl (Cymbalta)  60 mg PO BID [facility]


Enoxaparin Sodium (Lovenox)  30 mg SC 0900 [facility]


Fentanyl (Fentanyl Cadd)  0 mcg IVPB INF PRN


   PRN Reason: Pain


Fentanyl (Duragesic)  25 mcg TD Q3D@1600 [facility]


Glucagon (Glucagon)  1 mg IM PRN PRN


   PRN Reason: Hypoglycemia


Hydralazine HCl (Apresoline)  25 mg PO QID [facility]


Dextrose/Water (D5w)  1,000 mls @ 0 mls/hr IV .Q0M PRN; As Directed


   PRN Reason: Hypoglycemia


Insulin Detemir 25 units/ (Miscellaneous Medication)  0.25 mls @ 0 mls/hr SC HS 

[facility]


Insulin Human Lispro (Humalog)  0 units SC .MODERATE SLIDING SC PRN


   PRN Reason: Moderate Correctional Scale


Levothyroxine Sodium (Synthroid)  25 mcg PO 0600 [facility]


Miscellaneous Information (Communication Order-Pharmacy)  1 each FS ONE [facility]


   Stop: 01/25/18 23:00


Miscellaneous Information (Communication Order-Pharmacy)  1 each FS ONE [facility]


   Stop: 01/25/18 23:00


Naloxone HCl (Narcan)  0.2 mg IV Q5MIN PRN


   PRN Reason: Opiate Reversal


Ondansetron HCl (Zofran)  4 mg IV Q6H PRN


   PRN Reason: Nausea


Promethazine HCl (Phenergan)  12.5 mg IM Q4H PRN


   PRN Reason: Nausea


Rosuvastatin Calcium (Crestor)  20 mg PO QAM [facility]


Sodium Chloride (Flush - Normal Saline)  10 ml IVF PRN PRN


   PRN Reason: Saline Flush


Tamsulosin HCl (Flomax)  0.4 mg PO HS [facility]


Zolpidem Tartrate (Ambien)  5 mg PO HSPRN PRN


   PRN Reason: Insomnia

## 2018-01-26 LAB
ALBUMIN SERPL BCG-MCNC: 2.8 G/DL (ref 3.4–4.8)
ALP SERPL-CCNC: 137 U/L (ref 40–150)
ALT SERPL W P-5'-P-CCNC: (no result) U/L (ref 8–55)
ANION GAP SERPL CALC-SCNC: 15 MMOL/L (ref 10–20)
AST SERPL-CCNC: 10 U/L (ref 5–34)
BASOPHILS # BLD AUTO: 0 THOU/UL (ref 0–0.2)
BASOPHILS NFR BLD AUTO: 0.3 % (ref 0–1)
BILIRUB SERPL-MCNC: 0.2 MG/DL (ref 0.2–1.2)
BUN SERPL-MCNC: 45 MG/DL (ref 8.4–25.7)
CALCIUM SERPL-MCNC: 8.7 MG/DL (ref 7.8–10.44)
CHLORIDE SERPL-SCNC: 100 MMOL/L (ref 98–107)
CO2 SERPL-SCNC: 22 MMOL/L (ref 23–31)
CREAT CL PREDICTED SERPL C-G-VRATE: 43 ML/MIN (ref 70–130)
EOSINOPHIL # BLD AUTO: 0.2 THOU/UL (ref 0–0.7)
EOSINOPHIL NFR BLD AUTO: 3.3 % (ref 0–10)
GLOBULIN SER CALC-MCNC: 3.7 G/DL (ref 2.4–3.5)
GLUCOSE SERPL-MCNC: 430 MG/DL (ref 80–115)
HGB BLD-MCNC: 7.7 G/DL (ref 14–18)
LYMPHOCYTES # BLD: 1.5 THOU/UL (ref 1.2–3.4)
LYMPHOCYTES NFR BLD AUTO: 26.6 % (ref 21–51)
MCH RBC QN AUTO: 29.3 PG (ref 27–31)
MCV RBC AUTO: 90.3 FL (ref 80–94)
MONOCYTES # BLD AUTO: 0.4 THOU/UL (ref 0.11–0.59)
MONOCYTES NFR BLD AUTO: 8 % (ref 0–10)
NEUTROPHILS # BLD AUTO: 3.4 THOU/UL (ref 1.4–6.5)
NEUTROPHILS NFR BLD AUTO: 61.8 % (ref 42–75)
PLATELET # BLD AUTO: 229 THOU/UL (ref 130–400)
POTASSIUM SERPL-SCNC: 4.7 MMOL/L (ref 3.5–5.1)
RBC # BLD AUTO: 2.62 MILL/UL (ref 4.7–6.1)
SODIUM SERPL-SCNC: 132 MMOL/L (ref 136–145)
WBC # BLD AUTO: 5.5 THOU/UL (ref 4.8–10.8)

## 2018-01-26 RX ADMIN — INSULIN LISPRO PRN UNIT: 100 INJECTION, SOLUTION INTRAVENOUS; SUBCUTANEOUS at 11:45

## 2018-01-26 RX ADMIN — INSULIN LISPRO PRN UNIT: 100 INJECTION, SOLUTION INTRAVENOUS; SUBCUTANEOUS at 17:07

## 2018-01-26 NOTE — CON
DATE OF CONSULTATION:  01/26/2018

 

REASON FOR CONSULTATION:  Readmission for removal of tibial nail and washout.

 

HISTORY OF PRESENT ILLNESS:  A 67-year-old known to us from prior visits who has a history of type 2 
diabetes, hypertension, and stage III renal insufficiency with previous right tibia, fibula fracture 
in 06/2017.  The patient had an intervention and developed an infection at the site.  We decided to t
reat for a protracted period of time with vancomycin and gram negative coverage, which was cefepime. 
 He received pretty much 42 days in the nursing home.  In November, he had a nonhealing wound in the 
right patellar tendon area with exposed tendon and had debridement, an application of skin graft and 
then developed worsening inflammatory changes around the knee area and was admitted at the beginning 
of this month.  He had removal of the remaining hardware from the right tibia fibula and at this time
, repeat imaging studies were suggestive of osteomyelitis.  This consisted of a bone scan, which show
ed findings suspicious of the right proximal tibial osteomyelitis.  The organism isolated was methici
llin-resistant Staphylococcus aureus and we are planning to continue IV vancomycin through a PICC sindhu
e in left upper extremity.  The end date of therapy was going to be until the end of February or the 
first weeks of March.  The patient at this time is admitted for irrigation and debridement of the rig
ht tibial bone and removal of antibiotic nail.  The procedure was reviewed.  The area was washed.  Th
e nail was removed and the canal was curetted out.  Currently, he is having moderate pain in the righ
t knee area.  No headaches, no change in visual symptoms, sore throat, odynophagia or dysphagia.  No 
dyspnea or cough.  No chest pain.  No abdominal pain.  He is voiding spontaneously.  No diarrhea.  No
 change in neurological condition.

 

PAST MEDICAL AND SURGICAL HISTORY:  Obesity, type 2 diabetes, renal insufficiency stage 3, hypertensi
on, neuropathy, fracture of the right lower extremity with ORIF and then the complications noted abov
e.

 

ALLERGIES:  SULFA DRUGS with rash.

 

CURRENT MEDICATIONS:  Tylenol, DuoNebs, Xanax, Lipitor, Dulcolax, Bumex, PhosLo, dextrose, Benadryl, 
Cymbalta, Colace, Lovenox, fentanyl, glucagon, insulin, Synthroid, Narcan, Crestor and vancomycin.

 

FAMILY HISTORY:  Noncontributory.

 

SOCIAL HISTORY:  Lives in a nursing home.  No smoking history.

 

PHYSICAL EXAMINATION:

VITAL SIGNS:  The patient has been afebrile through the hospital stay.  Blood pressure 194/57, pulse 
95, respirations 12-20 and O2 sat 96%.

SKIN:  The findings in the operative site did not remove the dressings at this time.  The patient has
 a PICC line in the left upper extremity.  PICC line is flushing, but does not allow withdrawal of bl
ood.  Does not have a Roa catheter.

HEENT:  Somewhat disconjugate eye movements.  Oral cavity with numerous missing teeth, some gum disea
se.

NECK:  Supple.

LUNGS:  Symmetric clear breath sounds.

HEART:  S1 and S2, regular rate.

ABDOMEN:  Slightly distended, but not tender.  No ascites.  No bladder distention.

GENITOURINARY:  No genital abnormalities.

EXTREMITIES:  Pulses of left lower extremity is 1+ in dorsalis pedis.

NEUROLOGIC:  He is awake, alert, oriented and follows commands.

 

LABORATORY DATA:  White cell count 8.6 and 5.5, hemoglobin 7.6, MCV 89, platelets 249 with essentiall
y normal differential.  Chemistry with a creatinine of 2.88, which is fairly around his average.  Hyp
erglycemia noted ranging from mid 200s to 400s.  Albumin 2.8.  Two sets of blood cultures, no growth 
to date.  MRSA from 01/09, 2 different samples and previously had MRSA and Staph epidermidis from tib
ia tissue from July.

 

ASSESSMENT AND DISCUSSION:  Type 2 diabetes with chronic renal insufficiency, fracture right tibia wi
th the complications noted above, now admitted for removal of the nail impregnated with antimicrobial
s.  The patient is scheduled to continue on vancomycin at current dose until the end of February and 
then transition to oral minocycline suppressive therapy for a protracted period of time.  The dose wi
ll be 100 mg twice daily to be started after the discontinuation of the vancomycin at the end of Febr
uary.

## 2018-01-26 NOTE — PDOC.PN
- Subjective


Encounter Start Date: 01/26/18


Encounter Start Time: 10:00


PATIENT is seen today, alert and oriented. No other concern snoted. Patient 

still has pain 4/10 on PCA.





- Objective


MAR Reviewed: Yes


Vital Signs & Weight: 


 Vital Signs (12 hours)











  Temp Pulse Resp BP BP Pulse Ox


 


 01/26/18 14:32   80  20   


 


 01/26/18 14:15     95/62  


 


 01/26/18 11:48  98.3 F  92  18   96/52 L  95


 


 01/26/18 09:34       93 L


 


 01/26/18 09:29   92  20    93 L


 


 01/26/18 08:46  97.8 F  92  20    97


 


 01/26/18 08:30     109/54 L  


 


 01/26/18 08:09  97.8 F  89  16   118/72  97


 


 01/26/18 04:23  98.1 F  83  16   116/69  96








 Weight











Weight                         265 lb














I&O: 


 











 01/25/18 01/26/18 01/27/18





 06:59 06:59 06:59


 


Intake Total  1360 


 


Output Total  1175 


 


Balance  185 











Result Diagrams: 


 01/26/18 03:30





 01/26/18 03:30


Additional Labs: 


 Accuchecks











  01/26/18 01/26/18 01/25/18





  11:05 05:49 20:34


 


POC Glucose  315 H  387 H  494 H














  01/25/18





  12:33


 


POC Glucose  209 H











Radiology Reviewed by me: Yes





Phys Exam





- Physical Examination


Constitutional: NAD


HEENT: PERRLA, moist MMs


Neck: no nodes, no JVD


Respiratory: no wheezing, no rales


Cardiovascular: RRR, no significant murmur


Gastrointestinal: soft, non-tender


Musculoskeletal: pulses present


Neurological: non-focal, normal sensation





Dx/Plan


(1) Osteomyelitis of right tibia


Code(s): M86.9 - OSTEOMYELITIS, UNSPECIFIED   Status: Acute   


Qualifiers: 


   Osteomyelitis type: other acute   Qualified Code(s): M86.161 - Other acute 

osteomyelitis, right tibia and fibula   


Comment: Seen by orthopedic surgwey Post op today day2 , Maryam has 

intramedulary removal and rteplaceing with new one, Stbale on PCA. No 

Antibiotics yet, i will leave this upto orthopedics, Discussed with nurse to 

confirm with Orthopedics PA.   





(2) CAD (coronary artery disease)


Code(s): I25.10 - ATHSCL HEART DISEASE OF NATIVE CORONARY ARTERY W/O ANG PCTRS 

  Status: Chronic   


Qualifiers: 


 


Comment: Stable. Aspoirin when Able to by orthopedics   





(3) DM2 (diabetes mellitus, type 2)


Status: Chronic   


Qualifiers: 


 


Comment: Will increase levemir 40units BID and SSI.  Plan to Keep -180, 

titrate up the levemir as needed.   





(4) HTN (hypertension)


Code(s): I10 - ESSENTIAL (PRIMARY) HYPERTENSION   Status: Chronic   Comment: 

stable. restart pt on home Meds. Keep BP <130/80   





- Plan


cont current plan of care, PT/OT, , respiratory therapy, 

incentive spirometry, DVT proph w/lovenox





* .








- Discharge Day


Encounter end time: 10:35





Review of Systems





- Review of Systems


Constitutional: negative: fever, chills, sweats, weakness, malaise, other


Eyes: negative: Pain, Vision Change, Conjunctivae Inflammation, Eyelid 

Inflammation, Redness, Other


ENT: negative: Ear Pain, Ear Discharge, Nose Pain, Nose Discharge, Nose 

Congestion, Mouth Pain, Mouth Swelling, Throat Pain, Throat Swelling, Other


Respiratory: negative: Cough, Dry, Shortness of Breath, Hemoptysis, SOB with 

Excertion, Pleuritic Pain, Sputum, Wheezing


Cardiovascular: negative: chest pain, palpitations, orthopnea, paroxysmal 

nocturnal dyspnea, edema, light headedness, other


Genitourinary: negative: Dysuria, Frequency, Incontinence, Hematuria, Retention

, Other


Musculoskeletal: Leg Pain





- Medications/Allergies


Allergies/Adverse Reactions: 


 Allergies











Allergy/AdvReac Type Severity Reaction Status Date / Time


 


Sulfa (Sulfonamide Allergy   Verified 01/24/18 15:37





Antibiotics)     











Medications: 


 Current Medications





Acetaminophen (Tylenol)  650 mg PO Q6H PRN


   PRN Reason: Headache/Temp >101F/Mild Pain


Albuterol/Ipratropium (Duoneb)  3 ml NEB TID-RT [facility]


   Last Admin: 01/26/18 14:32 Dose:  3 ml


Alprazolam (Xanax)  0.5 mg PO 0800,1300,1800 [facility]


   Last Admin: 01/26/18 12:49 Dose:  0.5 mg


Alprazolam (Xanax)  0.5 mg PO HS [facility]


Atorvastatin Calcium (Lipitor)  40 mg PO HS [facility]


   Last Admin: 01/25/18 20:03 Dose:  40 mg


Bisacodyl (Dulcolax)  10 mg IL Q8H PRN


   PRN Reason: Constipation


Bumetanide (Bumex)  0.5 mg PO BID-AC [facility]


   Last Admin: 01/26/18 08:31 Dose:  0.5 mg


Calcium Acetate (Phoslo)  1,334 mg PO TID-WM [facility]


   Last Admin: 01/26/18 11:42 Dose:  1,334 mg


Dextrose/Water (Dextrose 50%)  25 gm SLOW IVP PRN PRN


   PRN Reason: Hypoglycemia


Diphenhydramine HCl (Benadryl)  25 mg IVP Q3H PRN


   PRN Reason: Itching


   Last Admin: 01/26/18 00:18 Dose:  25 mg


Diphenhydramine HCl (Benadryl)  25 mg PO Q3H PRN


   PRN Reason: Itching


   Last Admin: 01/26/18 11:42 Dose:  25 mg


Diphenhydramine HCl (Benadryl)  25 mg IM Q3H PRN


   PRN Reason: Itching


Docusate Sodium (Colace)  100 mg PO BID [facility]


   Last Admin: 01/26/18 08:30 Dose:  100 mg


Duloxetine HCl (Cymbalta)  60 mg PO BID [facility]


   Last Admin: 01/26/18 08:30 Dose:  60 mg


Enoxaparin Sodium (Lovenox)  30 mg SC 0900 [facility]


   Last Admin: 01/26/18 08:30 Dose:  30 mg


Fentanyl (Fentanyl Cadd)  0 mcg IVPB INF PRN


   PRN Reason: Pain


Fentanyl (Duragesic)  25 mcg TD Q3D@1600 [facility]


   Last Admin: 01/25/18 16:46 Dose:  25 mcg


Glucagon (Glucagon)  1 mg IM PRN PRN


   PRN Reason: Hypoglycemia


Hydralazine HCl (Apresoline)  25 mg PO QID [facility]


   Last Admin: 01/26/18 14:15 Dose:  Not Given


Dextrose/Water (D5w)  1,000 mls @ 0 mls/hr IV .Q0M PRN; As Directed


   PRN Reason: Hypoglycemia


Insulin Detemir 40 units/ (Miscellaneous Medication)  0.4 mls @ 0 mls/hr SC BID 

[facility]


   PRN Reason: As Directed


Sodium Chloride (Normal Saline 0.9%)  1,000 mls @ 75 mls/hr IV .[id number] [facility]


   Last Admin: 01/26/18 11:42 Dose:  1,000 mls


Vancomycin HCl 750 mg/ Sodium (Chloride)  250 mls @ 250 mls/hr IVPB 0300,1500 

[facility]


Insulin Human Lispro (Humalog)  0 units SC .MODERATE SLIDING SC PRN


   PRN Reason: Moderate Correctional Scale


   Last Admin: 01/26/18 11:45 Dose:  8 unit


Levothyroxine Sodium (Synthroid)  25 mcg PO 0600 [facility]


   Last Admin: 01/26/18 05:58 Dose:  25 mcg


Naloxone HCl (Narcan)  0.2 mg IV Q5MIN PRN


   PRN Reason: Opiate Reversal


Ondansetron HCl (Zofran)  4 mg IV Q6H PRN


   PRN Reason: Nausea


Promethazine HCl (Phenergan)  12.5 mg IM Q4H PRN


   PRN Reason: Nausea


Rosuvastatin Calcium (Crestor)  20 mg PO QAM [facility]


   Last Admin: 01/26/18 08:43 Dose:  20 mg


Sodium Chloride (Flush - Normal Saline)  10 ml IVF PRN PRN


   PRN Reason: Saline Flush


Tamsulosin HCl (Flomax)  0.4 mg PO HS [facility]


   Last Admin: 01/25/18 20:03 Dose:  0.4 mg


Zolpidem Tartrate (Ambien)  5 mg PO HSPRN PRN


   PRN Reason: Insomnia


   Last Admin: 01/25/18 20:03 Dose:  5 mg

## 2018-01-26 NOTE — CON
DATE OF CONSULTATION:  01/26/2018

 

CONSULTING PHYSICIAN:  Teofilo

 

REASON FOR CONSULTATION:  Chronic kidney disease.

 

REASON FOR ADMISSION:  Right lower extremity pain.

 

HISTORY OF PRESENT ILLNESS:  A 67-year-old male with history of type 2 diabetes, hypertension, chroni
c kidney disease, came to the hospital with the above complaints and is being treated by Orthopedic BRIGITTE ibrahim.  The patient's creatinine is stable and he does have chronic kidney disease.  He denies any c
hest pain, palpitation, fever or chills, no nausea or vomiting to me.

 

PAST MEDICAL HISTORY:  Diabetes, hypertension, coronary artery disease, chronic kidney disease.

 

PAST SURGICAL HISTORY:  Coronary artery bypass, left ankle and right elbow surgery, left toe surgery.


 

HOME MEDICATIONS:  Xanax, Lantus, Norco, ascorbic acid, Cymbalta, Levemir, Synthroid, Humulin, MiraLa
x, Senokot, Crestor.

 

ALLERGIES:  SULFA.

 

SOCIAL HISTORY:  No smoking, alcohol or drug abuse.

 

FAMILY HISTORY:  No history of kidney disease.

 

REVIEW OF SYSTEMS:  The following complete review of systems was

negative, unless otherwise mentioned in the HPI or below:

Constitutional:  Weight loss or gain, ability to conduct usual

activities.

Skin:  Rash, itching.

Eyes:  Double vision, pain.

ENT/Mouth:  Nose bleeding, neck stiffness, pain, tenderness.

Cardiovascular:  Palpitations, dyspnea on exertion, orthopnea.

Respiratory:  Shortness of breath, wheezing, cough, hemoptysis, 

fever or night sweats.

Gastrointestinal:  Poor appetite, abdominal pain, heartburn, 

nausea, vomiting, constipation, or diarrhea.

Genitourinary:  Urgency, frequency, dysuria, nocturia.

Musculoskeletal:  Pain, swelling.

Neurologic/Psychiatric:  Anxiety, depression.

Allergy/Immunologic:  Skin rash, bleeding tendency.

 

PHYSICAL EXAMINATION:

GENERAL:  This is an elderly male in no apparent distress.

VITAL SIGNS:  Temperature 97.8, pulse 92, respiratory rate 18, blood pressure 109/50.

HEENT:  Atraumatic, normocephalic.  Oral mucosa is moist.

NECK:  Supple.

CARDIOVASCULAR:  S1, S2 heard.  Rate and rhythm regular.

RESPIRATORY:  Clear.

ABDOMEN:  Soft.

MUSCULOSKELETAL:  1+ edema.

DERMATOLOGIC:  No skin rash.

NEUROLOGIC:  Alert, awake.

PSYCHIATRIC:  Mood and affect normal.

 

LABORATORY AND X-RAY FINDINGS:  Creatinine is 2.8, potassium was 4.7.

 

ASSESSMENT AND PLAN:

1.  Acute kidney injury on chronic kidney disease stage 4.  The patient needs close follow up as outp
atient.

2.  Hyperglycemia.

3.  Type 2 diabetes.

4.  Edema.

5.  Hypertension.

6.  Renal function close to baseline.  No acute indication for dialysis.  We will monitor.  

 

The patient needs close follow up as outpatient.  We will follow.

 

Thank you for the consult.

## 2018-01-27 VITALS — DIASTOLIC BLOOD PRESSURE: 71 MMHG | TEMPERATURE: 98.4 F | SYSTOLIC BLOOD PRESSURE: 119 MMHG

## 2018-01-27 LAB
ANION GAP SERPL CALC-SCNC: 10 MMOL/L (ref 10–20)
BUN SERPL-MCNC: 45 MG/DL (ref 8.4–25.7)
CALCIUM SERPL-MCNC: 8.8 MG/DL (ref 7.8–10.44)
CHLORIDE SERPL-SCNC: 105 MMOL/L (ref 98–107)
CO2 SERPL-SCNC: 27 MMOL/L (ref 23–31)
CREAT CL PREDICTED SERPL C-G-VRATE: 47 ML/MIN (ref 70–130)
GLUCOSE SERPL-MCNC: 136 MG/DL (ref 80–115)
POTASSIUM SERPL-SCNC: 4.5 MMOL/L (ref 3.5–5.1)
SODIUM SERPL-SCNC: 137 MMOL/L (ref 136–145)

## 2018-01-27 RX ADMIN — HYDROCODONE BITARTRATE AND ACETAMINOPHEN PRN TAB: 5; 325 TABLET ORAL at 16:40

## 2018-01-27 RX ADMIN — INSULIN LISPRO PRN UNIT: 100 INJECTION, SOLUTION INTRAVENOUS; SUBCUTANEOUS at 16:41

## 2018-01-27 RX ADMIN — HYDROCODONE BITARTRATE AND ACETAMINOPHEN PRN TAB: 5; 325 TABLET ORAL at 12:41

## 2018-01-27 NOTE — PDOC.PN
- Subjective


Encounter Start Date: 01/27/18


Encounter Start Time: 07:15


Patient is seen today, drowsy, pt is S/p Medicated Noel replacement for right 

Tibia. poorly controlled DM.





- Objective


MAR Reviewed: Yes


Vital Signs & Weight: 


 Vital Signs (12 hours)











  Temp Pulse Resp BP BP Pulse Ox


 


 01/27/18 12:39   86   125/71  


 


 01/27/18 11:00  98 F  86  14   125/71  97


 


 01/27/18 09:51       96


 


 01/27/18 09:50  98.0 F  88  16    97


 


 01/27/18 09:33   88   107/64  


 


 01/27/18 07:30  98 F  88  16   107/64  97


 


 01/27/18 04:37  98.6 F  92  19   127/72  98








 Weight











Weight                         265 lb














I&O: 


 











 01/26/18 01/27/18 01/28/18





 06:59 06:59 06:59


 


Intake Total 1360  


 


Output Total 1175 1700 


 


Balance 185 -1700 











Result Diagrams: 


 01/26/18 03:30





 01/27/18 06:05


Additional Labs: 


 Accuchecks











  01/27/18 01/27/18 01/26/18





  11:00 05:32 20:28


 


POC Glucose  159 H  149 H  243 H














  01/26/18





  16:19


 


POC Glucose  273 H














Phys Exam





- Physical Examination


HEENT: PERRLA, moist MMs


Neck: no nodes, no JVD


Respiratory: no wheezing, no rales


Cardiovascular: RRR, no significant murmur


Gastrointestinal: soft, non-tender


Musculoskeletal: no edema, pulses present


Neurological: non-focal





Dx/Plan


(1) Osteomyelitis of right tibia


Code(s): M86.9 - OSTEOMYELITIS, UNSPECIFIED   Status: Acute   


Qualifiers: 


   Osteomyelitis type: other acute   Qualified Code(s): M86.161 - Other acute 

osteomyelitis, right tibia and fibula   


Comment: Seen by orthopedic surgwey Post op today day3 , Yennit has 

intramedulary removal and rteplaceing with new one, Bry on PCA. No 

Antibiotics yet, Pt will be seen by ID today to address Antibitoics.   





(2) CAD (coronary artery disease)


Code(s): I25.10 - ATHSCL HEART DISEASE OF NATIVE CORONARY ARTERY W/O ANG PCTRS 

  Status: Chronic   


Qualifiers: 


 


Comment: Stable. Aspirin when Able to by orthopedics   





(3) DM2 (diabetes mellitus, type 2)


Status: Chronic   


Qualifiers: 


 


Comment: Well controlled now, Will increase levemir 40units BID and SSI.  Plan 

to Keep -180, titrate up the levemir as needed. Patient planned to be 

Sent to Rehab today. Will sign off.   





(4) HTN (hypertension)


Code(s): I10 - ESSENTIAL (PRIMARY) HYPERTENSION   Status: Chronic   Comment: 

stable. restart pt on home Meds. Keep BP <130/80   





- Plan


cont current plan of care, PT/OT, respiratory therapy, DVT proph w/lovenox


Will sign off, pt will delmer to be back on his insulin pump when he goes home.





- Discharge Day


Encounter end time: 07:45





Review of Systems





- Review of Systems


ENT: negative: Ear Pain, Ear Discharge, Nose Pain, Nose Discharge, Nose 

Congestion, Mouth Pain, Mouth Swelling, Throat Pain, Throat Swelling, Other


Respiratory: negative: Cough, Dry, Shortness of Breath, Hemoptysis, SOB with 

Excertion, Pleuritic Pain, Sputum, Wheezing


Cardiovascular: negative: chest pain, palpitations, orthopnea, paroxysmal 

nocturnal dyspnea, edema, light headedness, other


Musculoskeletal: Leg Pain


Neurological: negative: Weakness, Numbness, Incoordination, Change in Speech, 

Confusion, Seizures, Other





- Medications/Allergies


Allergies/Adverse Reactions: 


 Allergies











Allergy/AdvReac Type Severity Reaction Status Date / Time


 


Sulfa (Sulfonamide Allergy   Verified 01/24/18 15:37





Antibiotics)     











Medications: 


 Current Medications





Acetaminophen (Tylenol)  650 mg PO Q6H PRN


   PRN Reason: Headache/Temp >101F/Mild Pain


Hydrocodone Bitart/Acetaminophen (Norco 5/325)  1 tab PO Q4H PRN


   PRN Reason: PAIN SCALE 1-4


Hydrocodone Bitart/Acetaminophen (Norco 5/325)  2 tab PO Q4H PRN


   PRN Reason: PAIN SCALE 5-10


   Last Admin: 01/27/18 12:41 Dose:  2 tab


Albuterol/Ipratropium (Duoneb)  3 ml NEB TID-RT [facility]


   Last Admin: 01/27/18 09:50 Dose:  3 ml


Alprazolam (Xanax)  0.5 mg PO 0800,1300,1800 [facility]


   Last Admin: 01/27/18 12:39 Dose:  0.5 mg


Alprazolam (Xanax)  0.5 mg PO [facility]


   Last Admin: 01/26/18 20:48 Dose:  0.5 mg


Atorvastatin Calcium (Lipitor)  40 mg PO [facility]


   Last Admin: 01/26/18 20:47 Dose:  40 mg


Bisacodyl (Dulcolax)  10 mg LA Q8H PRN


   PRN Reason: Constipation


Bumetanide (Bumex)  0.5 mg PO BID-AC [facility]


   Last Admin: 01/27/18 09:35 Dose:  0.5 mg


Calcium Acetate (Phoslo)  1,334 mg PO TID-WM [facility]


   Last Admin: 01/27/18 12:39 Dose:  1,334 mg


Dextrose/Water (Dextrose 50%)  25 gm SLOW IVP PRN PRN


   PRN Reason: Hypoglycemia


Diphenhydramine HCl (Benadryl)  25 mg IVP Q3H PRN


   PRN Reason: Itching


   Last Admin: 01/26/18 00:18 Dose:  25 mg


Diphenhydramine HCl (Benadryl)  25 mg PO Q3H PRN


   PRN Reason: Itching


   Last Admin: 01/27/18 05:40 Dose:  25 mg


Diphenhydramine HCl (Benadryl)  25 mg IM Q3H PRN


   PRN Reason: Itching


Docusate Sodium (Colace)  100 mg PO BID [facility]


   Last Admin: 01/27/18 09:37 Dose:  100 mg


Duloxetine HCl (Cymbalta)  60 mg PO BID [facility]


   Last Admin: 01/27/18 09:35 Dose:  60 mg


Enoxaparin Sodium (Lovenox)  30 mg SC 0900 [facility]


   Last Admin: 01/27/18 09:45 Dose:  30 mg


Fentanyl (Duragesic)  25 mcg TD Q3D@1600 [facility]


   Last Admin: 01/25/18 16:46 Dose:  25 mcg


Glucagon (Glucagon)  1 mg IM PRN PRN


   PRN Reason: Hypoglycemia


Hydralazine HCl (Apresoline)  25 mg PO QID [facility]


   Last Admin: 01/27/18 12:39 Dose:  25 mg


Dextrose/Water (D5w)  1,000 mls @ 0 mls/hr IV .Q0M PRN; As Directed


   PRN Reason: Hypoglycemia


Insulin Detemir 40 units/ (Miscellaneous Medication)  0.4 mls @ 0 mls/hr SC BID 

[facility]


   PRN Reason: As Directed


   Last Admin: 01/27/18 09:37 Dose:  0.4 mls


Sodium Chloride (Normal Saline 0.9%)  1,000 mls @ 75 mls/hr IV .[id number] [facility]


   Last Admin: 01/27/18 12:38 Dose:  Not Given


Vancomycin HCl 500 mg/ Sodium (Chloride)  100 mls @ 100 mls/hr IVPB 0300,1500 

[facility]


   Last Admin: 01/27/18 02:01 Dose:  100 mls


Insulin Human Lispro (Humalog)  0 units SC .MODERATE SLIDING SC PRN


   PRN Reason: Moderate Correctional Scale


   Last Admin: 01/26/18 17:07 Dose:  6 unit


Levothyroxine Sodium (Synthroid)  25 mcg PO 0600 [facility]


   Last Admin: 01/27/18 05:34 Dose:  25 mcg


Miscellaneous Medication (Pharmacy To Dose)  1 each IVPB ONE PRN


   PRN Reason: DOSING


   Stop: 02/25/18 18:26


Naloxone HCl (Narcan)  0.2 mg IV Q5MIN PRN


   PRN Reason: Opiate Reversal


Ondansetron HCl (Zofran)  4 mg IV Q6H PRN


   PRN Reason: Nausea


Promethazine HCl (Phenergan)  12.5 mg IM Q4H PRN


   PRN Reason: Nausea


[facility]uvastatin Calcium ([facility]restor)  [facility]


   Last Admin: 01/27/18 09:45 Dose:  20 mg


Sodium Chloride (Flush - Normal Saline)  10 ml IVF PRN PRN


   PRN Reason: Saline Flush


Sodium Chloride (Flush - Normal Saline)  10 ml IVF PRN PRN


   PRN Reason: Saline Flush


Tamsulosin HCl (Flomax)  0.4 mg PO HS [facility]


   Last Admin: 01/26/18 20:47 Dose:  0.4 mg


Zolpidem Tartrate (Ambien)  5 mg PO HSPRN PRN


   PRN Reason: Insomnia


   Last Admin: 01/25/18 20:03 Dose:  5 mg

## 2018-01-27 NOTE — PRG
DATE OF CONSULTATION:  01/27/2018

 

HISTORY OF PRESENT ILLNESS:  Mr. Dexter is a pleasant 67-year-old male who is well-known to my serv
ice.  He has multiple medical problems to include end-stage renal disease, diabetes, history of right
 coronary artery disease, right tibia fracture with postoperative infection, osteomyelitis, status po
st antibiotic nail exchange.  The patient is resting comfortably in bed, currently using a PCA.  He h
as a history of chronic pain use.  The patient previously was at [facility].  The patient is curre
nt on IV antibiotics under the direction of Dr. Eisenberg followed by Medicine for control of his blood
 sugars.  The patient is comfortable, resting in bed.  He has a knee immobilizer.

 

PHYSICAL EXAMINATION: 

VITAL SIGNS:  The is afebrile.  Vital signs are stable.

GENERAL:  Alert and oriented male in no acute distress.  The patient's right lower extremity knee imm
obilizer is in place.  A little strike-through on the dressing, unchanged exam to the limb distally. 


 

IMPRESSION:  Right tibia osteomyelitis secondary to postoperative infection status post tibia nail ex
change with multiple medical problems.

 

ASSESSMENT AND PLAN:  The patient will be discharged back to skilled nursing.  I would like for him t
o stay in his knee immobilizer for a week to allow the sutures to heal in place.  I do not want the s
utures to come back out for 3 weeks given the patient's wound complication issues.  He may weightbear
 as tolerated.  The patient will receive his IV antibiotics under the direction of Dr. Eisenberg and need
s antibiotic suppression for a prolonged period of time.  The patient ultimately also will need to co
ntinue to be medically managed as an outpatient for his brittle diabetes.  The patient understands th
is.  He is in good spirits.  We will discontinue his PCA and transition him to p.o. pain medication.

## 2018-01-27 NOTE — PRG
DATE OF SERVICE:  01/27/2018

 

SUBJECTIVE:  Patient was seen and examined at bedside and overnight events noted.  Patient denies any
 shortness of breath or chest pain or palpitation.  No history of nausea or vomiting or diarrhea or f
ever or chills or cramps.

 

OBJECTIVE:

GENERAL:  Elderly male in no apparent distress.

VITAL SIGNS:  Temperature 98.4, pulse 89, respiratory 14, blood pressure 119/71.

HEENT: Atraumatic, normocephalic.  Oral mucosa is moist.

NECK:  Supple.

CARDIOVASCULAR:  S1, S2 heard.  Rate and rhythm regular.

RESPIRATORY:  Clear to auscultation.

GASTROINTESTINAL:  Abdomen is soft.

MUSCULOSKELETAL:  No tenderness.  No edema.

DERMATOLOGIC:  No skin rash.

NEUROLOGIC:  Alert and awake and oriented x3.  No focal neurologic deficits.  Moving all the extremit
ies.

PSYCHIATRIC:  Mood and affect normal.

 

LABORATORY DATA:  Not done today.

 

ASSESSMENT AND PLAN:

1.  Acute kidney injury on chronic kidney disease stage 4, stable.

2.  Hyperglycemia.

3.  Type 2 diabetes.

4.  Edema.

5.  Hypertension.

6.  Overall function is stable.  We will sign off.